# Patient Record
Sex: FEMALE | Race: WHITE | Employment: FULL TIME | URBAN - METROPOLITAN AREA
[De-identification: names, ages, dates, MRNs, and addresses within clinical notes are randomized per-mention and may not be internally consistent; named-entity substitution may affect disease eponyms.]

---

## 2017-01-05 PROBLEM — Z87.442 HISTORY OF RENAL STONE: Status: ACTIVE | Noted: 2017-01-05

## 2017-02-13 ENCOUNTER — APPOINTMENT (OUTPATIENT)
Dept: PHYSICAL THERAPY | Age: 72
End: 2017-02-13

## 2017-05-31 ENCOUNTER — HOSPITAL ENCOUNTER (OUTPATIENT)
Dept: PHYSICAL THERAPY | Age: 72
Discharge: HOME | End: 2017-05-31
Payer: COMMERCIAL

## 2017-05-31 PROCEDURE — G8978 MOBILITY CURRENT STATUS: HCPCS

## 2017-05-31 PROCEDURE — 97530 THERAPEUTIC ACTIVITIES: CPT

## 2017-05-31 PROCEDURE — 97110 THERAPEUTIC EXERCISES: CPT

## 2017-05-31 PROCEDURE — 97162 PT EVAL MOD COMPLEX 30 MIN: CPT

## 2017-05-31 PROCEDURE — G8979 MOBILITY GOAL STATUS: HCPCS

## 2017-05-31 NOTE — PROGRESS NOTES
PHYSICAL THERAPY - DAILY TREATMENT NOTE    Patient Name: Campos Perez        Date: 2017  : 1945   YES Patient  Verified  Visit #:     Insurance: Payor: Angelo Yoder / Plan: [employer] HMO / Product Type: HMO /      In time: 10:55 Out time: 11:55   Total Treatment Time: 60 min     Medicare Time Tracking (below)   Total Timed Codes (min):  23 1:1 Treatment Time:  60     TREATMENT AREA =  S/P R IVANIA    SUBJECTIVE    Pain Level (on 0 to 10 scale):  0  / 10   Medication Changes/New allergies or changes in medical history, any new surgeries or procedures? NO    If yes, update Summary List   Subjective Functional Status/Changes:  []  No changes reported     Pt is S/P R IVANIA anterior approach on 2017. Pt was in hospital x 26 hours and had HHPT x 4 weeks. Pt denies falls since surgery and does not report difficulty getting into/out of home.         OBJECTIVE    Physical Therapy Evaluation- Hip    Gait:  [] Normal    [x] Abnormal    [x] Antalgic    [] NWB    Device: SPC    Describe: Ant trunk lean, dec hip extension, inc R pelvic rotation, dec H/T pattern, antalgic gait         ROM/Strength        AROM                     PROM        Strength (1-5)  Hip Left Right Left Right Left Right   Flexion 116 94 125 100 4 3+   Extension     NT NT   Abduction     4- 3+   ER 33 26 38 27 4- 3   IR 38 30 42 40 4- 3+   Knee Left Right Left Right Left Right   Extension     4+ 4   Flexion     4+ 4     Fair bridge     Flexibility: [] Unable to assess at this time  Hamstrings:    (L) Tightness= [] WNL   [x] Min   [] Mod   [] Severe    (R) Tightness= [] WNL   [] Min   [x] Mod   [] Severe  Quadriceps:    (L) Tightness= [] WNL   [] Min   [] Mod   [] Severe    (R) Tightness= [] WNL   [] Min   [x] Mod   [] Severe  Gastroc:    (L) Tightness= [] WNL   [] Min   [x] Mod   [] Severe    (R) Tightness= [] WNL   [] Min   [x] Mod   [] Severe                                  Palpation  [] Min  [x] Mod [x] Severe    Location: anterior incision, R GT, R gluteals, R ITB and quad/ITB junction      15 (15) min Therapeutic Exercise:  [x]  See flow sheet   Rationale:      increase ROM, increase strength, improve coordination, improve balance and increase proprioception to improve the patients ability to perform ADL's and functional mobility with increased ease and efficiency of movement      8 (8) min Therapeutic Activity: FOTO   Rationale: To assess current functional limitations and review POC    throughout min Patient Education:  YES  Reviewed HEP   []  Progressed/Changed HEP based on:   Provided HEP handout     Other Objective/Functional Measures:    See above     Post Treatment Pain Level (on 0 to 10) scale:   0  / 10     ASSESSMENT    Assessment/Changes in Function:     Patient History: High (r knee pain, Hx bilateral hip bursitis, Sarcoiditis, Age)  Examination (low 1-2, mod 3+, high 4+): Moderate per objective above  Clinical Presentation (low stable or uncomplicated, mod evolving or changing, high unstable or unpredictable): Moderate  Clinical Decision Making (low , mod 26-74, high 1-25): FOTO Moderate     [x]  See Progress Note/Recertification   Patient will continue to benefit from skilled PT services to modify and progress therapeutic interventions, address functional mobility deficits, address ROM deficits, address strength deficits, analyze and address soft tissue restrictions, analyze and cue movement patterns, analyze and modify body mechanics/ergonomics, assess and modify postural abnormalities, address imbalance/dizziness and instruct in home and community integration to attain remaining goals. to attain remaining goals.    Progress toward goals / Updated goals:    See plan of care     PLAN    [x]  Upgrade activities as tolerated YES Continue plan of care   []  Discharge due to :    []  Other:      Therapist: Ivan Barthel, DPT    Date: 5/31/2017 Time: 10:56 AM

## 2017-05-31 NOTE — PROGRESS NOTES
[address]  [facility] PHYSICAL THERAPY  [address], [address] - Phone: [phone]  Fax: [phone] / [address] [facility]  Patient Name: Danny Vargas : 1945   Medical   Diagnosis: Bilateral hip pain [M25.551, M25.552]  Acute postoperative pain of hip [M25.559, G89.18] Treatment Diagnosis: S/P R IVANIA   Onset Date: 2017 AGE: 81 y.o. Referral Source: Nida Bejarano MD [facility]): 2017   Prior Hospitalization: See medical history Provider #: [id number]   Prior Level of Function: Mod (I)   Comorbidities: Sarcoiditis, Hx kidney stones, incontinence, LBP   Medications: Verified on Patient Summary List   The Plan of Care and following information is based on the information from the initial evaluation.   =======================================================================================  Assessment / key information:  Patient is a 81 y.o. female who presents with chief complaint of R hip pain and decreased mobility following R IVANIA on 2017. Pt reports hospital stay x 2 days followed by 4 weeks of HHPT. Pt presents to PT evaluation ambulating with antalgic pattern, SPC and apparent gait deviations and dec dynamic balance. Pt demo dec R hip flexion/extension AROM/PROM, inc TTP cristina-incisional and ITB/VL interface, dec proximal LE strength, dec LE flexibility, dec bed mobility, dec functional transfers and overall impaired mobility. Patient's FOTO functional score is 43% indicating 57% functional disability. Patient would benefit from skilled PT services to address these issues and improve the above stated impairments.   Thank you for this referral     Objective Data:    ROM/Strength            AROM                             PROM                         Strength (1-5)  Hip Left Right Left Right Left Right   Flexion 116 94 125 100 4 3+   Extension         NT NT   Abduction         4- 3+   ER 33 26 38 27 4- 3   IR 38 30 42 40 4- 3+   Knee Left Right Left Right Left Right   Extension         4+ 4   Flexion         4+ 4     ======================================================================================  Eval Complexity: History: HIGH Complexity :3+ comorbidities / personal factors will impact the outcome/ POC Exam:MEDIUM Complexity : 3 Standardized tests and measures addressing body structure, function, activity limitation and / or participation in recreation  Presentation: MEDIUM Complexity : Evolving with changing characteristics  Clinical Decision Making:MEDIUM Complexity : FOTO score of 26-74Overall Complexity:MEDIUM  Problem List: pain affecting function, decrease ROM, decrease strength, edema affecting function, impaired gait/ balance, decrease ADL/ functional abilitiies, decrease activity tolerance, decrease flexibility/ joint mobility and decrease transfer abilities   Treatment Plan may include any combination of the following: Therapeutic exercise, Therapeutic activities, Neuromuscular re-education, Physical agent/modality, Gait/balance training, Manual therapy, Aquatic therapy, Patient education, Self Care training, Functional mobility training, Home safety training and Stair training  Patient / Family readiness to learn indicated by: asking questions, trying to perform skills and interest  Persons(s) to be included in education: patient (P) and family support person (FSP);list   Barriers to Learning/Limitations: None  Measures taken:    Patient Goal (s): Bend/lift, walk without cane   Patient self reported health status: good  Rehabilitation Potential: good   Short Term Goals: To be accomplished in  2-3  Weeks:  1. Patient will be compliant with HEP in order to facilitate progression of therapeutic exercise and improve mobility  2. Patient will demonstrate independent bed mobility moving supine <> sit  3.  Patient will improve R hip flexion AROM to 100 degrees in order to improve safety with stair negotiation  4. Patient will negotiate 5 steps mod (I) w/ LRAD and use of unilateral handrail to improve home navigation.  Long Term Goals: To be accomplished in  4-6  Weeks:  1. Patient will be independent with HEP in order to demonstrate ability to perform therapeutic exercises and continue progressing functional mobility upon D/C  2. Patient will perform 5 x sit to stand independently and equal LE WB in order to improve independence with functional mobility  3. Patient will negotiate 10 steps with reciprocal pattern, no AD and use of unilateral hand rail in order to improve safety with home/community navigation  4. Patient will demonstrate R hip flexion AROM of 105 degrees in order to improve ease functional mobility  5. Patient will improve FOTO score to 50/100 to demonstrate a meaningful improvement in functional mobility and increased quality of life      Frequency / Duration:   Patient to be seen  2-3  times per week for 4-6  weeks:  Patient / Caregiver education and instruction: self care, activity modification and exercises  G-Codes (GP): Mobility [id number] Current  CK= 40-59%   Goal  CK= 40-59%. The severity rating is based on the FOTO Score  Therapist Signature: Keary Favre, DPT Date: 7/78/6961   Certification Period: 5/31/17 - 8/30/17 Time: 12:11 PM   ===========================================================================================  I certify that the above Physical Therapy Services are being furnished while the patient is under my care. I agree with the treatment plan and certify that this therapy is necessary. Physician Signature:        Date:       Time:     Please sign and return to [employer] or you may fax the signed copy to [phone]. Thank you.

## 2017-06-06 ENCOUNTER — HOSPITAL ENCOUNTER (OUTPATIENT)
Dept: PHYSICAL THERAPY | Age: 72
Discharge: HOME | End: 2017-06-06
Payer: COMMERCIAL

## 2017-06-06 PROCEDURE — 97110 THERAPEUTIC EXERCISES: CPT

## 2017-06-06 NOTE — PROGRESS NOTES
PHYSICAL THERAPY - DAILY TREATMENT NOTE      Patient Name: Anya Gutiérrez        Date: 2017  : 1945   YES Patient  Verified  Visit #:   2     Insurance: Payor: Catracho Davey / Plan: [employer] HMO / Product Type: HMO /      In time: 12:58 Out time: 1:52   Total Treatment Time: 54     Medicare Time Tracking (below)   Total Timed Codes (min):  44 1:1 Treatment Time:  44     TREATMENT AREA =  Bilateral hip pain [M25.551, M25.552]  Acute postoperative pain of hip [M25.559, G89.18]    SUBJECTIVE    Pain Level (on 0 to 10 scale):  0  / 10   Medication Changes/New allergies or changes in medical history, any new surgeries or procedures? NO    If yes, update Summary List   Subjective Functional Status/Changes:  []  No changes reported     \"Doing them on the bed, I cannot do. The only one that I find difficulty is that bridge. \"  \"The scar is not as bad, its a little numb but I don't get that electric shock. \"        OBJECTIVE    Modalities Rationale:   min [] Estim, type/location:                                      []  att     []  unatt     []  w/US     []  w/ice    []  w/heat    min []  Mechanical Traction: type/lbs                   []  pro   []  sup   []  int   []  cont    []  before manual    []  after manual    min []  Ultrasound, settings/location:      min []  Iontophoresis w/ dexamethasone, location:                                               []  take home patch       []  in clinic   10 min [x]  Ice     []  Heat    location/position: Supine to R hip w/ BLEs on wedge    min []  Vasopneumatic Device, press/temp:     min []  Other:    [x] Skin assessment post-treatment (if applicable):    [x]  intact    []  redness- no adverse reaction     []redness  adverse reaction:      39 (39) min Therapeutic Exercise:  [x]  See flow sheet   Rationale:      increase ROM, increase strength, improve coordination, improve balance and increase proprioception to improve the patients ability to perform ADL's and functional mobility with increased ease and efficiency of movement      5 (5) min Gait Training: HT gait pattern, pelvic rotation, hip extension w/ and w/o SPC   Rationale:    improve coordination, improve balance and increase proprioception to improve the patients ability to ambulate with improved gait mechanics to dec LE compensation patterns. 1 min Patient Education:  YES  Reviewed HEP   []  Progressed/Changed HEP based on:   Standing hip abduction/extension and heel raises     Other Objective/Functional Measures:    Pt ambulates into clinic SPC L hand. Demo dec R hip extension, medial heel whip,   Initiated session with Nu-step L1  Added standing standing hip abduction/extension 2x5 d/t dec endurance and muscle fatigue. Fair bridge although improved since IE  Attempted clamshell however pt demo inc lateral R hip pain with L sidelying position. Pt required mod(A) to move supine to L sidelying for RLE negotiation. Pain produced when RLE moves into adducted position. Post Treatment Pain Level (on 0 to 10) scale:   0-1  / 10     ASSESSMENT    Assessment/Changes in Function:     Pt with improved gait mechanics with VC and demo at beginning of session today. Tolerated WB therex well today without inc R hip pain. []  See Progress Note/Recertification   Patient will continue to benefit from skilled PT services to modify and progress therapeutic interventions, address functional mobility deficits, address ROM deficits, address strength deficits, analyze and address soft tissue restrictions, analyze and cue movement patterns, analyze and modify body mechanics/ergonomics, assess and modify postural abnormalities, address imbalance/dizziness and instruct in home and community integration to attain remaining goals. Progress toward goals / Updated goals: · Short Term Goals: To be accomplished in 2-3 Weeks:  1.  Patient will be compliant with HEP in order to facilitate progression of therapeutic exercise and improve mobility. Demo compliance  2. Patient will demonstrate independent bed mobility moving supine <> sit. Mod (A) for RLE negotiation moving to L S/L   3. Patient will improve R hip flexion AROM to 100 degrees in order to improve safety with stair negotiation. 4. Patient will negotiate 5 steps mod (I) w/ LRAD and use of unilateral handrail to improve home navigation.  NT         PLAN    [x]  Upgrade activities as tolerated YES Continue plan of care   []  Discharge due to :    []  Other:      Therapist: Fabiola Luna DPT    Date: 6/6/2017 Time: 10:46 AM     Future Appointments  Date Time Provider Yocasta Loaiza   6/6/2017 1:00 PM Cory OMSAN [facility]   6/8/2017 9:00 AM Cassandra José Miguel, [facility]   6/12/2017 9:30 AM Cassandra Valdez, [facility]   6/20/2017 8:30 AM Cassandra Valdez [facility]   6/22/2017 9:00 AM Cassandra Valdez [facility]   6/27/2017 10:00 AM Rubén [facility]   6/30/2017 8:30 AM [facility]

## 2017-06-08 ENCOUNTER — HOSPITAL ENCOUNTER (OUTPATIENT)
Dept: PHYSICAL THERAPY | Age: 72
Discharge: HOME | End: 2017-06-08
Payer: COMMERCIAL

## 2017-06-08 PROCEDURE — 97110 THERAPEUTIC EXERCISES: CPT

## 2017-06-08 PROCEDURE — 97530 THERAPEUTIC ACTIVITIES: CPT

## 2017-06-08 NOTE — PROGRESS NOTES
PHYSICAL THERAPY - DAILY TREATMENT NOTE    Patient Name: Carlos Precise        Date: 2017  : 1945   YES Patient  Verified  Visit #:   3     Insurance: Payor: José Miguel Kaba / Plan: [employer] HMO / Product Type: HMO /      In time: 9:00 Out time: 9:50   Total Treatment Time: 50     Medicare Time Tracking (below)   Total Timed Codes (min):  50 1:1 Treatment Time:  40     TREATMENT AREA =  Bilateral hip pain [M25.551, M25.552]  Acute postoperative pain of hip [M25.559, G89.18]    SUBJECTIVE    Pain Level (on 0 to 10 scale):  0  / 10   Medication Changes/New allergies or changes in medical history, any new surgeries or procedures? NO     If yes, update Summary List   Subjective Functional Status/Changes:  []  No changes reported     \"My hip is pretty much the same. I am more confident walking around, there is still stiffness and pain. I can't go back to work, they won't hold the position for me. I felt ok after the other day. I am so sore and stiff when I lay down, I can't sleep without a pillow under my leg.  I am feeling more confident with walking\"          OBJECTIVE    42 (32) min Therapeutic Exercise:  [x]  See flow sheet   Rationale:      increase ROM, increase strength, improve coordination, improve balance and increase proprioception to improve the patients ability to perform ADL's and amb with decreased pain and improved ease     8 min Therapeutic Activity: Step-ups, sit to stand   Rationale:   Improve safety and stability with home and community mobility     min Patient Education:  YES  Reviewed HEP   []  Progressed/Changed HEP based on:   Patient reports compliance     Other Objective/Functional Measures:    Able to perform std hip abd/ext 10 reps without break  Unable to perform step-up on stair, able to perform 10 reps on 4\" box  Performed sit to stands, cues to avoid UE assist. Able to perform 2 reps no UE, 5 reps 1 UE with cueing  Cues to maintain TA control to decrease strain on lumbar spine     Post Treatment Pain Level (on 0 to 10) scale:   0  / 10     ASSESSMENT    Assessment/Changes in Function:     Patient with improving strength, activity tolerance, and exercise tolerance this visit     []  See Progress Note/Recertification   Patient will continue to benefit from skilled PT services to modify and progress therapeutic interventions, address functional mobility deficits, address ROM deficits, address strength deficits, analyze and address soft tissue restrictions, analyze and cue movement patterns, analyze and modify body mechanics/ergonomics and assess and modify postural abnormalities to attain remaining goals. Progress toward goals / Updated goals: · Short Term Goals: To be accomplished in 2-3 Weeks:  1. Patient will be compliant with HEP in order to facilitate progression of therapeutic exercise and improve mobility. Demo compliance  2. Patient will demonstrate independent bed mobility moving supine <> sit. Mod (A) for RLE negotiation moving to L S/L   3. Patient will improve R hip flexion AROM to 100 degrees in order to improve safety with stair negotiation. 4. Patient will negotiate 5 steps mod (I) w/ LRAD and use of unilateral handrail to improve home navigation.  NT     PLAN    [x]  Upgrade activities as tolerated YES Continue plan of care   []  Discharge due to :    []  Other:      Therapist: Mingo Prado PT    Date: 6/8/2017 Time: 9:05 AM     Future Appointments  Date Time Provider Yocasta Loaiza   6/12/2017 9:30 AM Mingo Prado [facility]   6/14/2017 3:30 PM Mingo Prado PT [facility]   6/20/2017 8:30 AM Mingo Prado PT [facility]   6/22/2017 9:00 AM Mingo Prado PT [facility]   6/27/2017 10:00 AM [facility]   6/30/2017 8:30 AM [facility]

## 2017-06-12 ENCOUNTER — HOSPITAL ENCOUNTER (OUTPATIENT)
Dept: PHYSICAL THERAPY | Age: 72
Discharge: HOME | End: 2017-06-12
Payer: COMMERCIAL

## 2017-06-12 PROCEDURE — 97110 THERAPEUTIC EXERCISES: CPT

## 2017-06-12 NOTE — PROGRESS NOTES
PHYSICAL THERAPY - DAILY TREATMENT NOTE    Patient Name: Xochitl Mora        Date: 2017  : 1945   YES Patient  Verified  Visit #:     Insurance: Payor: Lina Jadyn / Plan: [employer]MO / Product Type: HMO /      In time: 9:22 Out time: 10:10   Total Treatment Time: 48     Medicare Time Tracking (below)   Total Timed Codes (min):  48 1:1 Treatment Time:  30     TREATMENT AREA =  Bilateral hip pain [M25.551, M25.552]  Acute postoperative pain of hip [M25.559, G89.18]    SUBJECTIVE    Pain Level (on 0 to 10 scale):  0  / 10   Medication Changes/New allergies or changes in medical history, any new surgeries or procedures? NO     If yes, update Summary List   Subjective Functional Status/Changes:  []  No changes reported     \"I noticed last night my spasms aren't as severe. \"          OBJECTIVE    48 (30) min Therapeutic Exercise:  [x]  See flow sheet   Rationale:      increase ROM, increase strength, improve coordination, improve balance and increase proprioception to improve the patients ability to perform ADL's and amb with decreased pain and improved ease      min Patient Education:  YES  Reviewed HEP   []  Progressed/Changed HEP based on:   Patient reports compliance     Other Objective/Functional Measures:    Discussed with patient gradual return to driving, advised to practice in empty parking lot first and progress as able safely  Patient amb without AD today with good mechanics  Able to perform 10 sit to stand on elevated mat without UE assist     Post Treatment Pain Level (on 0 to 10) scale:   0  / 10     ASSESSMENT    Assessment/Changes in Function:     Patient required less cueing and able to perform sit to stand without use of UE's     []  See Progress Note/Recertification   Patient will continue to benefit from skilled PT services to modify and progress therapeutic interventions, address functional mobility deficits, address ROM deficits, address strength deficits, analyze and address soft tissue restrictions, analyze and cue movement patterns, analyze and modify body mechanics/ergonomics and assess and modify postural abnormalities to attain remaining goals. Progress toward goals / Updated goals: · Short Term Goals: To be accomplished in 2-3 Weeks:  1. Patient will be compliant with HEP in order to facilitate progression of therapeutic exercise and improve mobility. Demo compliance  2. Patient will demonstrate independent bed mobility moving supine <> sit. Mod (A) for RLE negotiation moving to L S/L   3. Patient will improve R hip flexion AROM to 100 degrees in order to improve safety with stair negotiation. 4. Patient will negotiate 5 steps mod (I) w/ LRAD and use of unilateral handrail to improve home navigation.  NT     PLAN    [x]  Upgrade activities as tolerated YES Continue plan of care   []  Discharge due to :    []  Other:      Therapist: Chester Fuentes PT    Date: 6/12/2017 Time: 9:27 AM     Future Appointments  Date Time Provider Yocasta Loaiza   6/12/2017 9:30 AM Chester Fuentes PT [facility]   6/14/2017 3:30 PM Chester Fuentes PT [facility]   6/20/2017 8:30 AM Chester Fuentes PT [facility]   6/22/2017 9:00 AM Chester Fuentes PT [facility]   6/27/2017 10:00 AM [facility]   6/30/2017 8:30 AM [facility]

## 2017-06-14 ENCOUNTER — HOSPITAL ENCOUNTER (OUTPATIENT)
Dept: PHYSICAL THERAPY | Age: 72
Discharge: HOME | End: 2017-06-14
Payer: COMMERCIAL

## 2017-06-14 PROCEDURE — 97110 THERAPEUTIC EXERCISES: CPT

## 2017-06-14 NOTE — PROGRESS NOTES
PHYSICAL THERAPY - DAILY TREATMENT NOTE    Patient Name: Concepcion Shafer        Date: 2017  : 1945   YES Patient  Verified  Visit #:     Insurance: Payor: Shasta Sathya / Plan: [employer] HMO / Product Type: HMO /      In time: 3:15 Out time: 4:05   Total Treatment Time: 50     Medicare Time Tracking (below)   Total Timed Codes (min):  50 1:1 Treatment Time:  38     TREATMENT AREA =  Bilateral hip pain [M25.551, M25.552]  Acute postoperative pain of hip [M25.559, G89.18]    SUBJECTIVE    Pain Level (on 0 to 10 scale):  0  / 10   Medication Changes/New allergies or changes in medical history, any new surgeries or procedures? NO     If yes, update Summary List   Subjective Functional Status/Changes:  []  No changes reported     \"I am having a bit of an upset stomach today, but I think I am ok to exercise. I am not using my cane at all anymore! .\"          OBJECTIVE    50 (38) min Therapeutic Exercise:  [x]  See flow sheet   Rationale:      increase ROM, increase strength, improve coordination, improve balance and increase proprioception to improve the patients ability to perform ADL\"S and amb with decreased pain and improved ease      min Patient Education:  YES  Reviewed HEP   []  Progressed/Changed HEP based on:   Patient reports compliance     Other Objective/Functional Measures:    patient with min cueing for form with therex  Able to perform sit to stand with mat slightly lowered with no UE assist  Progressed reps/resistance with good tolerance     Post Treatment Pain Level (on 0 to 10) scale:   0  / 10     ASSESSMENT    Assessment/Changes in Function:     Patient is progressing well with therapy at this time     []  See Progress Note/Recertification   Patient will continue to benefit from skilled PT services to modify and progress therapeutic interventions, address functional mobility deficits, address ROM deficits, address strength deficits, analyze and address soft tissue restrictions, analyze and cue movement patterns, analyze and modify body mechanics/ergonomics and assess and modify postural abnormalities to attain remaining goals. Progress toward goals / Updated goals: · Short Term Goals: To be accomplished in 2-3 Weeks:  1. Patient will be compliant with HEP in order to facilitate progression of therapeutic exercise and improve mobility. Demo compliance  2. Patient will demonstrate independent bed mobility moving supine <> sit. Mod (A) for RLE negotiation moving to L S/L   3. Patient will improve R hip flexion AROM to 100 degrees in order to improve safety with stair negotiation. 4. Patient will negotiate 5 steps mod (I) w/ LRAD and use of unilateral handrail to improve home navigation.  NT     PLAN    [x]  Upgrade activities as tolerated YES Continue plan of care   []  Discharge due to :    []  Other:      Therapist: Priscilla Pinedo PT    Date: 6/14/2017 Time: 3:07 PM     Future Appointments  Date Time Provider Yocasta Loaiza   6/14/2017 3:30 PM Priscilla Pinedo PT [facility]   6/20/2017 8:30 AM Priscilla Pinedo PT [facility]   6/22/2017 9:00 AM Priscilla Pinedo PT [facility]   6/27/2017 10:00 AM [facility]   6/30/2017 8:30 AM [facility]

## 2017-06-20 ENCOUNTER — HOSPITAL ENCOUNTER (OUTPATIENT)
Dept: PHYSICAL THERAPY | Age: 72
Discharge: HOME | End: 2017-06-20
Payer: COMMERCIAL

## 2017-06-20 PROCEDURE — 97110 THERAPEUTIC EXERCISES: CPT

## 2017-06-20 NOTE — PROGRESS NOTES
PHYSICAL THERAPY - DAILY TREATMENT NOTE    Patient Name: Anu Perla        Date: 2017  : 1945   YES Patient  Verified  Visit #:     Insurance: Payor: Milagros Cabreraler / Plan: [employer] HMO / Product Type: HMO /      In time: 9:28 Out time: 10:26   Total Treatment Time: 58     Medicare Time Tracking (below)   Total Timed Codes (min):  48 1:1 Treatment Time:  40     TREATMENT AREA =  Bilateral hip pain [M25.551, M25.552]  Acute postoperative pain of hip [M25.559, G89.18]    SUBJECTIVE    Pain Level (on 0 to 10 scale):  8-9  / 10   Medication Changes/New allergies or changes in medical history, any new surgeries or procedures? NO     If yes, update Summary List   Subjective Functional Status/Changes:  []  No changes reported     \"Yesterday I have this sharp pain in my hip and back, I don't know why. It is agony when I put weight on my R leg. It is R here (points to R PSIS). I iced yesterday and had to take a tramadol. \"     \"A week before my surgery I lifted something heavy at work and this same thing happened. I reported it at work also. Would they say this is a new occurrence, if so thank goodness. \"    \"I did notice I am crossing my legs more, I did also lay on my side in bed and I started hurting so I rolled back. \"    \"I drove yesterday! It didn't hurt when I drove but I could barely stand when I got out of the car. \"    \"Do you think I should go to the doctor today? \"         OBJECTIVE    Modalities Rationale:  palliative      min [] Estim, type/location:                                      []  att     []  unatt     []  w/US     []  w/ice    []  w/heat    min []  Mechanical Traction: type/lbs                   []  pro   []  sup   []  int   []  cont    []  before manual    []  after manual    min []  Ultrasound, settings/location:      min []  Iontophoresis w/ dexamethasone, location:                                               []  take home patch       []  in clinic   10 min [x]  Ice     []  Heat    location/position: Supine with LE wedge to R hip/SIJ region    min []  Vasopneumatic Device, press/temp:     min []  Other:    [x] Skin assessment post-treatment (if applicable):   [x]  intact    []  redness- no adverse reaction     []redness  adverse reaction:      48 (40) min Therapeutic Exercise:  [x]  See flow sheet   Rationale:      increase ROM, increase strength, improve coordination, improve balance and increase proprioception to improve the patients ability to perform ADL's and amb with decreased pain and improved ease      min Patient Education:  YES  Reviewed HEP   []  Progressed/Changed HEP based on:   Patient reports compliance     Other Objective/Functional Measures:    patient with antalgic very slow gait upon arrival, improved with increased mobility  Held some ex's due to SIJ pain; educated patient on SIJ dysfunction and relationship of muscle imbalances  Patient requested what she should tell MD to put on script for back, informed patient that she will need to be evaluated by doctor for back/possible SIJ pain before they will write script for therapy, then will need to set up additional evaluation  Reports relief in symptoms with ice  Performed sit to stand at 20\"     Post Treatment Pain Level (on 0 to 10) scale:   7  / 10     ASSESSMENT    Assessment/Changes in Function:     Progress limited this session due to SIJ dysfunction     []  See Progress Note/Recertification   Patient will continue to benefit from skilled PT services to modify and progress therapeutic interventions, address functional mobility deficits, address ROM deficits, address strength deficits, analyze and address soft tissue restrictions, analyze and cue movement patterns, analyze and modify body mechanics/ergonomics and assess and modify postural abnormalities to attain remaining goals. Progress toward goals / Updated goals: · Short Term Goals: To be accomplished in 2-3 Weeks:  1. Patient will be compliant with HEP in order to facilitate progression of therapeutic exercise and improve mobility. Demo compliance  2. Patient will demonstrate independent bed mobility moving supine <> sit. Mod (A) for RLE negotiation moving to L S/L   3. Patient will improve R hip flexion AROM to 100 degrees in order to improve safety with stair negotiation. 4. Patient will negotiate 5 steps mod (I) w/ LRAD and use of unilateral handrail to improve home navigation.  NT     PLAN    [x]  Upgrade activities as tolerated YES Continue plan of care   []  Discharge due to :    []  Other:      Therapist: Mino Powell PT    Date: 6/20/2017 Time: 9:32 AM     Future Appointments  Date Time Provider Yocasta Loaiza   6/22/2017 9:00 AM [address] [place] [address]   6/27/2017 10:00 AM [facility]   6/30/2017 8:30 AM [facility]   7/3/2017 4:30 PM Mino Powell PT [facility]   7/5/2017 11:30 AM Mino Powell PT [facility]   7/11/2017 10:30 AM Mino Powell PT [facility]   7/13/2017 9:30 AM Mino Powell PT [facility]   7/18/2017 11:30 AM Mino Powell PT [facility]   7/20/2017 9:30 AM Mino Powell PT [facility]   7/25/2017 11:00 AM Mino Powell PT [facility]   7/27/2017 3:00 PM Mino Powell PT [facility]

## 2017-06-22 ENCOUNTER — HOSPITAL ENCOUNTER (OUTPATIENT)
Dept: PHYSICAL THERAPY | Age: 72
Discharge: HOME | End: 2017-06-22
Payer: COMMERCIAL

## 2017-06-22 PROCEDURE — 97110 THERAPEUTIC EXERCISES: CPT

## 2017-06-22 NOTE — PROGRESS NOTES
PHYSICAL THERAPY - DAILY TREATMENT NOTE    Patient Name: Miley Schwarz        Date: 2017  : 1945   YES Patient  Verified  Visit #:     Insurance: Payor: Stephanie Peres / Plan: [employer] HMO / Product Type: HMO /      In time: 9:00 Out time: 10:10   Total Treatment Time: 70     Medicare Time Tracking (below)   Total Timed Codes (min):  60 1:1 Treatment Time:  53     TREATMENT AREA =  Bilateral hip pain [M25.551, M25.552]  Acute postoperative pain of hip [M25.559, G89.18]    SUBJECTIVE    Pain Level (on 0 to 10 scale):  8  / 10   Medication Changes/New allergies or changes in medical history, any new surgeries or procedures? YES     If yes, update Summary List   Subjective Functional Status/Changes:  []  No changes reported     \"My back pain is down about 1 point from the other day. The doctor gave me a muscle relaxer, chlorzoxazone. Should I be taking that? He said to just take it at night because it would make me tired. Am I ever going to get better? \"          OBJECTIVE    Modalities Rationale:  palliative      min [] Estim, type/location:                                      []  att     []  unatt     []  w/US     []  w/ice    []  w/heat    min []  Mechanical Traction: type/lbs                   []  pro   []  sup   []  int   []  cont    []  before manual    []  after manual    min []  Ultrasound, settings/location:      min []  Iontophoresis w/ dexamethasone, location:                                               []  take home patch       []  in clinic   10 min [x]  Ice     []  Heat    location/position: R SIJ/hip    min []  Vasopneumatic Device, press/temp:     min []  Other:    [x] Skin assessment post-treatment (if applicable):   [x]  intact    []  redness- no adverse reaction     []redness  adverse reaction:      60 (53) min Therapeutic Exercise:  [x]  See flow sheet   Rationale:      increase ROM, increase strength, improve coordination, improve balance and increase proprioception to improve the patients ability to perform ADL's and amb with decreased pain and improved ease      min Patient Education:  YES  Reviewed HEP   []  Progressed/Changed HEP based on:   Patient reports compliance     Other Objective/Functional Measures:    Patient continues to perseverate on SIJ pain, scheduled for eval 7/5  Able to decreased height of mat to 19.5\" for sit to stand  Unable to tolerate step-ups due to SIJ pain  Advised patient to trial muscle relaxers tonight to assess tolerance     Post Treatment Pain Level (on 0 to 10) scale:   6-7  / 10     ASSESSMENT    Assessment/Changes in Function:     Patient continues to be limited by SIJ dysfunction and fear of discomfort     []  See Progress Note/Recertification   Patient will continue to benefit from skilled PT services to modify and progress therapeutic interventions, address functional mobility deficits, address ROM deficits, address strength deficits, analyze and address soft tissue restrictions, analyze and cue movement patterns, analyze and modify body mechanics/ergonomics and assess and modify postural abnormalities to attain remaining goals. Progress toward goals / Updated goals: · Short Term Goals: To be accomplished in 2-3 Weeks:  1. Patient will be compliant with HEP in order to facilitate progression of therapeutic exercise and improve mobility. Demo compliance  2. Patient will demonstrate independent bed mobility moving supine <> sit. Mod (A) for RLE negotiation moving to L S/L   3. Patient will improve R hip flexion AROM to 100 degrees in order to improve safety with stair negotiation. 4. Patient will negotiate 5 steps mod (I) w/ LRAD and use of unilateral handrail to improve home navigation.  NT     PLAN    [x]  Upgrade activities as tolerated YES Continue plan of care   []  Discharge due to :    []  Other:      Therapist: Shaye Epstein PT    Date: 6/22/2017 Time: 9:10 AM     Future Appointments  Date Time Provider Yocasta Loaiza   6/27/2017 10:00 AM Odilon OSMAN [facility]   6/30/2017 8:30 AM Odilon OSMAN [facility]   7/3/2017 4:30 PM Chester Fuentes, [facility]   7/5/2017 11:30 AM Chester Fuentes, [facility]   7/11/2017 10:30 AM Chester Fuentes, [facility]   7/13/2017 9:30 AM Chester Fuentes, [facility]   7/18/2017 11:30 AM Chester Fuentes, PT [facility]   7/20/2017 9:30 AM Chester Fuenets, PT [facility]   7/25/2017 11:00 AM Chesetr Fuentes, [facility]   7/27/2017 3:00 PM Chester Fuentes, [facility]

## 2017-06-27 ENCOUNTER — HOSPITAL ENCOUNTER (OUTPATIENT)
Dept: PHYSICAL THERAPY | Age: 72
Discharge: HOME | End: 2017-06-27
Payer: COMMERCIAL

## 2017-06-27 PROCEDURE — 97110 THERAPEUTIC EXERCISES: CPT

## 2017-06-27 PROCEDURE — 97140 MANUAL THERAPY 1/> REGIONS: CPT

## 2017-06-27 NOTE — PROGRESS NOTES
PHYSICAL THERAPY - DAILY TREATMENT NOTE      Patient Name: Radha Shirley        Date: 2017  : 1945   YES Patient  Verified  Visit #:     Insurance: Payor: Danis Both / Plan: [employer] HMO / Product Type: HMO /      In time: 10:00 Out time: 11:00   Total Treatment Time: 60 min     Medicare Time Tracking (below)   Total Timed Codes (min):  50 1:1 Treatment Time:  40     TREATMENT AREA =  Bilateral hip pain [M25.551, M25.552]  Acute postoperative pain of hip [M25.559, G89.18]    SUBJECTIVE    Pain Level (on 0 to 10 scale):    / 10   Medication Changes/New allergies or changes in medical history, any new surgeries or procedures? NO    If yes, update Summary List   Subjective Functional Status/Changes:  []  No changes reported     \"My pain was an 11/10 on  in my back. \"        OBJECTIVE    Modalities Rationale:   min [] Estim, type/location:                                      []  att     []  unatt     []  w/US     []  w/ice    []  w/heat    min []  Mechanical Traction: type/lbs                   []  pro   []  sup   []  int   []  cont    []  before manual    []  after manual    min []  Ultrasound, settings/location:      min []  Iontophoresis w/ dexamethasone, location:                                               []  take home patch       []  in clinic   10 min [x]  Ice     []  Heat    location/position: H/L to R SIJ    min []  Vasopneumatic Device, press/temp:     min []  Other:    [x] Skin assessment post-treatment (if applicable):    [x]  intact    []  redness- no adverse reaction     []redness  adverse reaction:      30 (30) min Therapeutic Exercise:  [x]  See flow sheet   Rationale:      increase ROM, increase strength, improve coordination, improve balance and increase proprioception to improve the patients ability to perform ADL's and functional mobility with increased ease and efficiency of movement      10 (10) min Manual Therapy: SIJ mobilizations, Shotgun, R ant rotation correction MET   Rationale:      decrease pain, increase ROM, increase tissue extensibility and increase postural awareness to improve patient's ability to dec pain with ambulation    1 min Patient Education:  YES  Reviewed HEP   []  Progressed/Changed HEP based on: Other Objective/Functional Measures:    R anteriorly rotated innominate. Slight pain reduction with RLE MMT performed in H/L position. Performed shotgun technique with slight relief  Able to bridge with dec pain following MET  Significant TTP R gluteal musculature and R SIJ     Post Treatment Pain Level (on 0 to 10) scale:   4 / 10     ASSESSMENT    Assessment/Changes in Function:     Pt progress limited at this time secondary to R SIJ pain. []  See Progress Note/Recertification   Patient will continue to benefit from skilled PT services to modify and progress therapeutic interventions, address functional mobility deficits, address ROM deficits, address strength deficits, analyze and address soft tissue restrictions, analyze and cue movement patterns, analyze and modify body mechanics/ergonomics, assess and modify postural abnormalities, address imbalance/dizziness and instruct in home and community integration to attain remaining goals. Progress toward goals / Updated goals: · Short Term Goals: To be accomplished in  2-3  Weeks:  1. Patient will be compliant with HEP in order to facilitate progression of therapeutic exercise and improve mobility. Reports compliance  2. Patient will demonstrate independent bed mobility moving supine <> sit. MET  3. Patient will improve R hip flexion AROM to 100 degrees in order to improve safety with stair negotiation  4. Patient will negotiate 5 steps mod (I) w/ LRAD and use of unilateral handrail to improve home navigation.        PLAN    [x]  Upgrade activities as tolerated YES Continue plan of care   []  Discharge due to :    []  Other:      Therapist: Ruperto Viveros LIA Cummings    Date: 6/27/2017 Time: 8:37 AM     Future Appointments  Date Time Provider Yocasta Degrooti   6/27/2017 10:00 AM Cory OSMAN [facility]   6/30/2017 8:30 AM Nelly OSMAN [facility]   7/3/2017 4:30 PM Storm Elizabeth, PT [facility]   7/5/2017 2:00 PM Storm Elizabeth, PT [facility]   7/7/2017 10:30 AM Storm Elizabeth, PT [facility]   7/11/2017 10:30 AM Storm Elizabeth, PT [facility]   7/13/2017 9:00 AM Storm Elizabeth, PT [facility]   7/18/2017 2:00 PM Storm Elizabeth, PT [facility]   7/20/2017 9:00 AM Storm Elizabeth, PT [facility]   7/25/2017 2:00 PM Storm Elizabeth, PT [facility]   7/27/2017 3:00 PM Storm Elizabeth, PT [facility]

## 2017-06-30 ENCOUNTER — HOSPITAL ENCOUNTER (OUTPATIENT)
Dept: PHYSICAL THERAPY | Age: 72
Discharge: HOME | End: 2017-06-30
Payer: COMMERCIAL

## 2017-06-30 PROCEDURE — 97530 THERAPEUTIC ACTIVITIES: CPT

## 2017-06-30 PROCEDURE — G8978 MOBILITY CURRENT STATUS: HCPCS

## 2017-06-30 PROCEDURE — 97110 THERAPEUTIC EXERCISES: CPT

## 2017-06-30 PROCEDURE — G8979 MOBILITY GOAL STATUS: HCPCS

## 2017-06-30 NOTE — PROGRESS NOTES
PHYSICAL THERAPY - DAILY TREATMENT NOTE      Patient Name: Teresa Hwoe        Date: 2017  : 1945   YES Patient  Verified  Visit #:     Insurance: Payor: Victorino Bean / Plan: [employer] HMO / Product Type: HMO /      In time: 8:15 Out time: 9:15   Total Treatment Time: 60 min     Medicare Time Tracking (below)   Total Timed Codes (min):  60 1:1 Treatment Time:  54     TREATMENT AREA =  Bilateral hip pain [M25.551, M25.552]  Acute postoperative pain of hip [M25.559, G89.18]    SUBJECTIVE    Pain Level (on 0 to 10 scale):  0  / 10   Medication Changes/New allergies or changes in medical history, any new surgeries or procedures? NO    If yes, update Summary List   Subjective Functional Status/Changes:  []  No changes reported     \"It's still bothering me a lot, but not too much today. \" (referring to SIJ)  \"The hip is doing great. \"        OBJECTIVE    35 (29) min Therapeutic Exercise:  [x]  See flow sheet   Rationale:      increase ROM, increase strength, improve coordination, improve balance and increase proprioception to improve the patients ability to perform ADL's and functional mobility with increased ease and efficiency of movement      25 (25) min Therapeutic Activity: FOTO, Re-assessment, Gait training with RW   Rationale: To assess current functional limitations and review POC, improve ease/efficiency and dec pain with ambulation    1 min Patient Education:  YES  Reviewed HEP   []  Progressed/Changed HEP based on:         Other Objective/Functional Measures:    See PN     Post Treatment Pain Level (on 0 to 10) scale:   0  / 10     ASSESSMENT    Assessment/Changes in Function:     See Pn     []  See Progress Note/Recertification   Patient will continue to benefit from skilled PT services to modify and progress therapeutic interventions, address functional mobility deficits, address ROM deficits, address strength deficits, analyze and address soft tissue restrictions, analyze and cue movement patterns, analyze and modify body mechanics/ergonomics, assess and modify postural abnormalities, address imbalance/dizziness and instruct in home and community integration to attain remaining goals.      Progress toward goals / Updated goals:    See PN     PLAN    [x]  Upgrade activities as tolerated YES Continue plan of care   []  Discharge due to :    []  Other:      Therapist: Mariusz Hoffmann DPT    Date: 6/30/2017 Time: 8:28 AM     Future Appointments  Date Time Provider Yocasta Loaiza   6/30/2017 8:30 AM Joe OSMAN [facility]   7/3/2017 4:30 PM Juliette Gitelman, [facility]   7/5/2017 2:00 PM Juliette Gitelman, [facility]   7/7/2017 10:30 AM Juliette Gitelman, [facility]   7/11/2017 10:30 AM Juliette Gitelman, [facility]   7/13/2017 9:00 AM Juliette Gitelman, [facility]   7/18/2017 2:00 PM Juliette Gitelman, [facility]   7/20/2017 9:00 AM Juliette Gitelman, [facility]   7/25/2017 2:00 PM Juliette Gitelman, [facility]   7/27/2017 3:00 PM Juliette Gitelman, [facility]

## 2017-06-30 NOTE — PROGRESS NOTES
[address]  [facility] PHYSICAL THERAPY  [address] [facility] [address] Papp[address], [address] - Phone: [phone]  Fax: [phone]  PROGRESS NOTE  Patient Name: Amarilis Sullivan : 1945   Treatment/Medical Diagnosis: Bilateral hip pain [M25.551, M25.552]  Acute postoperative pain of hip [M25.559, G89.18]   Referral Source: Fabiola Reynolds MD     Date of Initial Visit: 17 Attended Visits: 9 Missed Visits: 0     SUMMARY OF TREATMENT  Patient's POC has consisted of therex, therapeutic activities, manual therapy prn, modalities prn, pt. education, and a comprehensive HEP. Treatment strategies used to address functional mobility deficits, ROM deficits, strength deficits, analyze and address soft tissue restrictions, analyze and cue movement patterns, analyze and modify body mechanics/ergonomics, assess and modify postural abnormalities and instruct in home and community integration. Key Functional Changes/Progress  Patient demonstrates improved hip flexion, independence with bed mobility with increased time, minimal hip pain with transfers and improved overall hip girdle strength since IE. Patient's progress, however, has been significantly limited secondary to onset of R SI joint pain that began insidiously in mid . Pt consistently c/o pain levels between 4-8/10 in R SIJ. Pt is scheduled for PT evaluation on  for this and will initiate interventions to address SIJ pain. ROM/Strength            AROM                             PROM                         Strength (1-5)  Hip Left Right Left Right Left Right   Flexion 116 108 125 112 4 4-   Extension             NT NT   Abduction             4- 3+   ER 33 26 38 27 4 4-   IR 38 30 42 40 4 4   Knee Left Right Left Right Left Right   Extension             5 5   Flexion             5 5       Goal/Measure of Progress Goal Met? 1.  Patient will be compliant with HEP in order to facilitate progression of therapeutic exercise and improve mobility  2. Patient will demonstrate independent bed mobility moving supine <> sit  3. Patient will improve R hip flexion AROM to 100 degrees in order to improve safety with stair negotiation  4. Patient will negotiate 5 steps mod (I) w/ LRAD and use of unilateral handrail to improve home navigation. 1. MET    2. MET    3. MET    4. Not Met     New Goals to be achieved in __4__  weeks:  1. Patient will be independent with HEP in order to demonstrate ability to perform therapeutic exercises and continue progressing functional mobility upon D/C  2. Patient will perform 5 x sit to stand independently and equal LE WB in order to improve independence with functional mobility  3. Patient will negotiate 10 steps with reciprocal pattern w/ LRAD and use of unilateral hand rail in order to improve safety with home/community navigation  4. Patient will demonstrate R hip flexion AROM of 110 degrees in order to improve ease functional mobility  5. Patient will improve FOTO score to 60/100 to demonstrate a meaningful improvement in functional mobility and increased quality of life  Frequency / Duration:   Patient to be seen  2  times per week for 4  weeks:    G-Codes: Mobility  Current  CK= 40-59%   Goal  CK= 40-59%. The severity rating is based on the FOTO Score     RECOMMENDATIONS  Continue and progress functional therex/therapeutic activity as able, utilizing manual therapy and modalities prn. Progress patient towards independent HEP to facilitate self-management of symptoms and progress gains after PT. If you have any questions/comments please contact us directly at [phone]. Thank you for allowing us to assist in the care of your patient. Therapist Signature:  Nu Freeman DPT Date: 5/21/0665   Certification Period: 5/31/17 - 8/30/17     Reporting Period 5/31/17 - 6/30/17   Time: 8:29 AM   NOTE TO PHYSICIAN:  PLEASE COMPLETE THE ORDERS BELOW AND FAX TO   [facility]: [phone]. If you are unable to process this request in 24 hours please contact our office: [phone].    ___ I have read the above report and request that my patient continue as recommended.   ___ I have read the above report and request that my patient continue therapy with the following changes/special instructions:_________________________________________________________   ___ I have read the above report and request that my patient be discharged from therapy.      Physician Signature:        Date:       Time:

## 2017-07-03 ENCOUNTER — HOSPITAL ENCOUNTER (OUTPATIENT)
Dept: PHYSICAL THERAPY | Age: 72
Discharge: HOME | End: 2017-07-03
Payer: COMMERCIAL

## 2017-07-03 PROCEDURE — 97110 THERAPEUTIC EXERCISES: CPT

## 2017-07-03 NOTE — PROGRESS NOTES
PHYSICAL THERAPY - DAILY TREATMENT NOTE    Patient Name: Roscoe Ervin        Date: 7/3/2017  : 1945   YES Patient  Verified  Visit #:   10   of   12-18  Insurance: Payor: Jodee Ureña / Plan: [employer] / Product Type: HMO /      In time: 4:05 Out time: 5:18   Total Treatment Time: 73     Medicare Time Tracking (below)   Total Timed Codes (min):  63 1:1 Treatment Time:  55     TREATMENT AREA =  Bilateral hip pain [M25.551, M25.552]  Acute postoperative pain of hip [M25.559, G89.18]    SUBJECTIVE    Pain Level (on 0 to 10 scale):  5  / 10   Medication Changes/New allergies or changes in medical history, any new surgeries or procedures? NO     If yes, update Summary List   Subjective Functional Status/Changes:  []  No changes reported     \"I went to my PCP this morning because I have a UTI. I need to get a note to continue my Short Term Disability, he said I need to go to a physiatrist at [facility]. The spasms are better, I have been using my walker since Friday when Geraldo Nava told me. \"          OBJECTIVE    Modalities Rationale:  palliative      min [] Estim, type/location:                                      []  att     []  unatt     []  w/US     []  w/ice    []  w/heat    min []  Mechanical Traction: type/lbs                   []  pro   []  sup   []  int   []  cont    []  before manual    []  after manual    min []  Ultrasound, settings/location:      min []  Iontophoresis w/ dexamethasone, location:                                               []  take home patch       []  in clinic   10 min [x]  Ice     []  Heat    location/position: Supine with LE wedge    min []  Vasopneumatic Device, press/temp:     min []  Other:    [x] Skin assessment post-treatment (if applicable):   [x]  intact    []  redness- no adverse reaction     []redness  adverse reaction:      63 (55) min Therapeutic Exercise:  [x]  See flow sheet   Rationale:      increase ROM, increase strength, improve coordination, improve balance and increase proprioception to improve the patients ability to perform ADL's and amb with decreased pain and improved ease      min Patient Education:  YES  Reviewed HEP   []  Progressed/Changed HEP based on:   Patient reports compliance     Other Objective/Functional Measures:    Patient with fair tolerance to therex, limited by SIJ pain  Informed patient we will further discuss plan for SIJ/back after eval on Wed     Post Treatment Pain Level (on 0 to 10) scale:   1  / 10     ASSESSMENT    Assessment/Changes in Function:     Patient with fair tolerance to therex     []  See Progress Note/Recertification   Patient will continue to benefit from skilled PT services to modify and progress therapeutic interventions, address functional mobility deficits, address ROM deficits, address strength deficits, analyze and address soft tissue restrictions, analyze and cue movement patterns, analyze and modify body mechanics/ergonomics and assess and modify postural abnormalities to attain remaining goals. Progress toward goals / Updated goals:       New Goals to be achieved in __4__  weeks:  1. Patient will be independent with HEP in order to demonstrate ability to perform therapeutic exercises and continue progressing functional mobility upon D/C  2. Patient will perform 5 x sit to stand independently and equal LE WB in order to improve independence with functional mobility  3. Patient will negotiate 10 steps with reciprocal pattern w/ LRAD and use of unilateral hand rail in order to improve safety with home/community navigation  4. Patient will demonstrate R hip flexion AROM of 110 degrees in order to improve ease functional mobility  5.  Patient will improve FOTO score to 60/100 to demonstrate a meaningful improvement in functional mobility and increased quality of life     PLAN    [x]  Upgrade activities as tolerated YES Continue plan of care   []  Discharge due to :    []  Other: Therapist: Cat Royal PT    Date: 7/3/2017 Time: 4:08 PM     Future Appointments  Date Time Provider Yocasta Loaiza   7/3/2017 4:30 PM Cat Royal PT [facility]   7/5/2017 2:00 PM Cat Royal PT [facility]   7/7/2017 10:30 AM Cat Royal PT [facility]   7/11/2017 10:30 AM Cat Royal PT [facility]   7/13/2017 9:00 AM Cat Royal PT [facility]   7/18/2017 2:00 PM Cat Royal PT [facility]   7/20/2017 9:00 AM Cat Royal PT [facility]   7/25/2017 2:00 PM Cat Royal PT [facility]   7/27/2017 3:00 PM Cat Royal PT [facility]

## 2017-07-05 ENCOUNTER — HOSPITAL ENCOUNTER (OUTPATIENT)
Dept: PHYSICAL THERAPY | Age: 72
Discharge: HOME | End: 2017-07-05
Payer: COMMERCIAL

## 2017-07-05 ENCOUNTER — APPOINTMENT (OUTPATIENT)
Dept: PHYSICAL THERAPY | Age: 72
End: 2017-07-05
Payer: COMMERCIAL

## 2017-07-05 PROCEDURE — 97110 THERAPEUTIC EXERCISES: CPT

## 2017-07-05 PROCEDURE — 97530 THERAPEUTIC ACTIVITIES: CPT

## 2017-07-05 PROCEDURE — 97162 PT EVAL MOD COMPLEX 30 MIN: CPT

## 2017-07-05 PROCEDURE — G8978 MOBILITY CURRENT STATUS: HCPCS

## 2017-07-05 PROCEDURE — G8979 MOBILITY GOAL STATUS: HCPCS

## 2017-07-05 PROCEDURE — G8980 MOBILITY D/C STATUS: HCPCS

## 2017-07-05 NOTE — PROGRESS NOTES
PHYSICAL THERAPY - DAILY TREATMENT NOTE    Patient Name: Sharifa Nelson        Date: 2017  : 1945   YES Patient  Verified  Visit #:     Insurance: Payor: Raven Correa / Plan: [place] MEDICARE PART A & B / Product Type: Medicare /      In time: 2:00 Out time: 3:00   Total Treatment Time: 60     Medicare Time Tracking (below)   Total Timed Codes (min):  60 1:1 Treatment Time:  60     TREATMENT AREA =  L/S, SIJ, R hip  SUBJECTIVE    Pain Level (on 0 to 10 scale):  1-2  / 10   Medication Changes/New allergies or changes in medical history, any new surgeries or procedures? YES    If yes, update Summary List   Subjective Functional Status/Changes:  []  No changes reported     History of Condition: Patient reports R sided LBP began 17, insidious onset. Patient had driven for some distance that day, but unsure if that is cause. Patient is currently being treated for R IVANIA. Patient underwent x-rays on L/S in January which determined no significant degeneration. Patient reports pain is primarily over R PSIS, interm across to L side of back.     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#, standing 8 hours, twisting/turning, opening airplane doors); gardening and shopping    Pt Goals: \"I want to be me again, be able to walk and work and carry on normal activities without extreme pain\"     FOTO:33     LOW BACK EVALUATION    Objective:      Gait: amb with RW, slight trendelenberg on R without use of AD    Posture: increased kyphosis, rounded shoulders, forward    Palpation/Sensation: TTP R SIJ    (N - normal; R - reduced; MR - markedly reduced)       L/S ROM      Range   Effect  Strength (MMT)          Right        Left    Flex 50%  Psoas (L2,3) 4- 4   Ext 25%  Quads (L3) 5 5   R-lat flex 75%  Ant tib(L4) 5 4+   L-lat flex 75%  Hip Ext (S1, S2) NT NT   R-rot <25%  Glut Med(L5) Unable due to R hip pain 3-   L-rot <25%  Hamstrings(S1,S2) 5 5      Hip IR/ER       Strength (MMT)       Right     Left          Core: Sup Bridge Fair+ Fair+   Core: Side Bridge     Core: Prone plank       Special Tests                       Right     Left          Flexibility         Right              Left    Slump   90/90 HS -29 -23   SLR   Ely        Gregorio     Sl screen 3/5=70% LR   Carolyn     Gaenslen test   Hip IR (prone)     James/ELENA sign   Hip ER (prone)     Thigh thrust        Distraction        Lateral Compression        Sacral Provocation          Effect of:  DKC    Supine Bridge    SL Supine Bridge    Prone on Elbows    RFIS    REIL          SI Symmetry:    Standing        Supine            Prone                Dynamic      +/- R/L  ASIS    Fwd Flex    IC    Stork    PSIS    Seated FF      Sup to Sit: + R anterior innominate    15 min Therapeutic Exercise:  [x]  See flow sheet   Rationale:      increase ROM, increase strength and improve coordination to improve the patients ability to perform ADL's and amb with decreased pain and improved ease     15 min Therapeutic Activity: FOTO   Rationale:   Determine functional limitations     min Patient Education:  []  Review HEP   []  Progressed/Changed HEP based on:   HEP initiated      Other Objective/Functional Measures:    See above information     Post Treatment Pain Level (on 0 to 10) scale:   2  / 10     ASSESSMENT    Assessment/Changes in Function:     Justification for Eval Code Complexity:  Patient History (low 0, mod 1-2, high 3-4): R IVANIA, osteopenia, sarcoidosis, HTN, visual impairment, incontinence, Hx appendectomy, Hx ovary removal, allergies, arthritis, back pain, GI disease, kidney/bladder/urination problems, previous accidents  Examination (low 1-2, mod 3+, high 4+):   Clinical Presentation (low stable or uncomplicated, mod evolving or changing, high unstable or unpredictable):   Clinical Decision Making (low , mod 26-74, high 1-25): FOTO     See PoC     []  See Progress Note/Recertification   Patient will continue to benefit from skilled PT services to modify and progress therapeutic interventions, address functional mobility deficits, address ROM deficits, address strength deficits, analyze and address soft tissue restrictions, analyze and cue movement patterns, analyze and modify body mechanics/ergonomics and assess and modify postural abnormalities to attain remaining goals.    Progress toward goals / Updated goals:    Goals established, see PoC     PLAN    [x]  Upgrade activities as tolerated YES Continue plan of care   []  Discharge due to :    [x]  Other: Initiate PoC     Therapist: Fabrice Rosa PT    Date: 7/5/2017 Time: 2:01 PM

## 2017-07-05 NOTE — PROGRESS NOTES
[facility] PHYSICAL THERAPY  [address] [place] [address] Olivia Pappas, [address] - Phone: [phone]  Fax: [phone] / [address] [facility]  Patient Name: Rosie Son : 1945   Medical   Diagnosis: Low back pain [M54.5] Treatment Diagnosis: R SIJ dysfunction   Onset Date: 17     Referral Source: MD Dr. 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   ===========================================================================================  Assessment / key information:  Rosie Son is a 81 y.o.  female with Dx: Low back pain [M54.5], signs and symptoms consistent with R SIJ dysfunction. Patient reports insidious onset of R sided low back pain on 17. Patient reports she also had instances of LBP in  and 2017, per MD notes pain was left sided in April, and she received trigger point injections from pain management. Patient reports pain at that time was due to work injury, and perseverates on fear that this flare up of pain is related to previous incident. Objective: Patient presents with slight trendelenberg on R when amb without AD, impaired posture, and decreased lumbar ROM and LE strength.  Patient refuses prone lying and R sidelying at this time, due to increased right hip pain in that position. Patient is TTP over R SIJ, with + supine to long sit test for R anterior innominate. Patient also with + 90/90 HS test B (R -29, L -23). L/S ROM      Range   Effect  Strength (MMT)          Right        Left    Flex 50%   Psoas (L2,3) 4- 4   Ext 25%   Quads (L3) 5 5   R-lat flex 75%   Ant tib(L4) 5 4+   L-lat flex 75%   Hip Ext (S1, S2) NT NT   R-rot <25%   Glut Med(L5) Unable due to R hip pain 3-   L-rot <25%   Hamstrings(S1,S2) 5 5      FOTO score 33 points indicating 67% limitation in functional ability. Patient would benefit from skilled PT to address the below listed impairments.  Thank you for your referral.  ===========================================================================================  Eval Complexity: History: HIGH Complexity :3+ comorbidities / personal factors will impact the outcome/ POC Exam:HIGH Complexity : 4+ Standardized tests and measures addressing body structure, function, activity limitation and / or participation in recreation  Presentation: MEDIUM Complexity : Evolving with changing characteristics  Clinical Decision Making:MEDIUM Complexity : FOTO score of 26-74Overall Complexity:MEDIUM  Problem List: pain affecting function, decrease ROM, decrease strength, impaired gait/ balance, decrease ADL/ functional abilitiies, decrease activity tolerance, decrease flexibility/ joint mobility and decrease transfer abilities   Treatment Plan may include any combination of the following: Therapeutic exercise, Therapeutic activities, Neuromuscular re-education, Physical agent/modality, Gait/balance training, Manual therapy, Aquatic therapy, Patient education, Self Care training, Functional mobility training, Home safety training and Stair training  Patient / Family readiness to learn indicated by: asking questions, trying to perform skills and interest  Persons(s) to be included in education: patient (P)  Barriers to Learning/Limitations: None  Measures taken: na   Patient Goal (s): \"I want to be me again, be able to walk and work and carry on normal activities without extreme pain\"   Patient self reported health status: good  Rehabilitation Potential: good   Short Term Goals: To be accomplished in  2-3  weeks:  1. Patient will demonstrate compliance with HEP for symptom management at home. 2. Patient will demonstrate - supine to long sit test to correct pelvic obliquity and decrease symptoms with ADL's.  3. Patient will improve B HS flexibility to -20 deg to decrease stress on spine.  Long Term Goals: To be accomplished in  4-6  weeks:  1. Patient will be independent with HEP to self-manage and prevent symptoms upon DC. 2.  Patient will improve FOTO score to 56 points to indicate improved functional status. 3.  Patient will improve B hip abd strength to 3+/5 to improve dynamic pelvic stability in gait. 4.  Patient will demonstrate proper lifting mechanics with 30# without increased symptoms to prepare for RTW. Ongoing goals for R IVANIA per Progress Note dated 6/30/17  New Goals to be achieved in __4__  weeks:  1. Patient will be independent with HEP in order to demonstrate ability to perform therapeutic exercises and continue progressing functional mobility upon D/C  2. Patient will perform 5 x sit to stand independently and equal LE WB in order to improve independence with functional mobility  3. Patient will negotiate 10 steps with reciprocal pattern w/ LRAD and use of unilateral hand rail in order to improve safety with home/community navigation  4. Patient will demonstrate R hip flexion AROM of 110 degrees in order to improve ease functional mobility  5.  Patient will improve FOTO score to 60/100 to demonstrate a meaningful improvement in functional mobility and increased quality of life    Frequency / Duration:   Patient to be seen  2-3  times per week for 4-6  weeks:  Patient / Caregiver education and instruction: self care, activity modification and exercises  G-Codes (GP): Mobility [id number] Current  CL= 60-79%   Goal  CK= 40-59%  D/C  CL= 60-79%. The severity rating is based on the FOTO Score    Therapist Signature: Javy Mahmood PT Date: 5/3/9512   Certification Period: 5/31/17-8/30/17 Time: 2:01 PM   ===========================================================================================  I certify that the above Physical Therapy Services are being furnished while the patient is under my care. I agree with the treatment plan and certify that this therapy is necessary. Physician Signature:        Date:       Time:     Please sign and return to [employer] or you may fax the signed copy to [phone]. Thank you.

## 2017-07-07 ENCOUNTER — HOSPITAL ENCOUNTER (OUTPATIENT)
Dept: PHYSICAL THERAPY | Age: 72
Discharge: HOME | End: 2017-07-07
Payer: COMMERCIAL

## 2017-07-07 PROCEDURE — 97110 THERAPEUTIC EXERCISES: CPT

## 2017-07-07 NOTE — PROGRESS NOTES
PHYSICAL THERAPY - DAILY TREATMENT NOTE    Patient Name: Annamaria Cantu        Date: 2017  : 1945   YES Patient  Verified  Visit #:     Insurance: Payor: Barbi Ulmer / Plan: [place] / Product Type: Medicare /      In time: 10:25 Out time: 11:35   Total Treatment Time: 70     Medicare Time Tracking (below)   Total Timed Codes (min):  60 1:1 Treatment Time:  45     TREATMENT AREA =  Bilateral hip pain [M25.551, M25.552]  Acute postoperative pain of hip [M25.559, G89.18]  Lower back pain [M54.5]    SUBJECTIVE    Pain Level (on 0 to 10 scale):  2  / 10   Medication Changes/New allergies or changes in medical history, any new surgeries or procedures? NO     If yes, update Summary List   Subjective Functional Status/Changes:  []  No changes reported     \"I thought about everything we talked about. I was due to go back to work Aug 1, but there is no way. I changed my appt with Dr Amarilis Anders to  to try to extend that. I went to the appointment today, however Dr. Gerardo Castillo was caught in traffic and they wanted me to see the PA but I rescheduled instead. I know my hip is not ready to go back to work, so I am going to try to extend my disability based on that. \"          OBJECTIVE    Modalities Rationale:  palliative      min [] Estim, type/location:                                      []  att     []  unatt     []  w/US     []  w/ice    []  w/heat    min []  Mechanical Traction: type/lbs                   []  pro   []  sup   []  int   []  cont    []  before manual    []  after manual    min []  Ultrasound, settings/location:      min []  Iontophoresis w/ dexamethasone, location:                                               []  take home patch       []  in clinic   10 min [x]  Ice     []  Heat    location/position: Supine with LE wedge    min []  Vasopneumatic Device, press/temp:     min []  Other:    [x] Skin assessment post-treatment (if applicable):   [x]  intact    [] redness- no adverse reaction     []redness  adverse reaction:      60 (45) min Therapeutic Exercise:  [x]  See flow sheet   Rationale:      increase ROM, increase strength, improve coordination, improve balance and increase proprioception to improve the patients ability to perform ADL's and amb with decreased pain and improved ease      min Patient Education:  YES  Reviewed HEP   []  Progressed/Changed HEP based on:   Patient reports compliance     Other Objective/Functional Measures:    Patient with good tolerance to therex, improved pelvic obliquity this session  Added piriformis stretch and SB core with fair tolerance     Post Treatment Pain Level (on 0 to 10) scale:   3  / 10 \"sore\"     ASSESSMENT    Assessment/Changes in Function:     Patient is progressing well with therex at this time     []  See Progress Note/Recertification   Patient will continue to benefit from skilled PT services to modify and progress therapeutic interventions, address functional mobility deficits, address ROM deficits, address strength deficits, analyze and address soft tissue restrictions, analyze and cue movement patterns, analyze and modify body mechanics/ergonomics and assess and modify postural abnormalities to attain remaining goals. Progress toward goals / Updated goals: · Short Term Goals: To be accomplished in  2-3  weeks:  1. Patient will demonstrate compliance with HEP for symptom management at home. 2. Patient will demonstrate - supine to long sit test to correct pelvic obliquity and decrease symptoms with ADL's.  3. Patient will improve B HS flexibility to -20 deg to decrease stress on spine. · Long Term Goals: To be accomplished in  4-6  weeks:  1. Patient will be independent with HEP to self-manage and prevent symptoms upon DC. 2.  Patient will improve FOTO score to 56 points to indicate improved functional status.   3.  Patient will improve B hip abd strength to 3+/5 to improve dynamic pelvic stability in gait.  4.  Patient will demonstrate proper lifting mechanics with 30# without increased symptoms to prepare for RTW.      PLAN    [x]  Upgrade activities as tolerated YES Continue plan of care   []  Discharge due to :    []  Other:      Therapist: Florencio Meek PT    Date: 7/7/2017 Time: 10:10 AM     Future Appointments  Date Time Provider Yocasta Loaiza   7/7/2017 10:30 AM Florencio Meek PT [facility]   7/11/2017 10:30 AM Florencio Meek PT [facility]   7/13/2017 9:00 AM Florencio Meek PT [facility]   7/18/2017 2:00 PM Florencio Meek PT [facility]   7/20/2017 9:00 AM Florencio Meek PT [facility]   7/25/2017 2:00 PM Florencio Meek PT [facility]   7/27/2017 3:00 PM Florencio Meek PT [facility]

## 2017-07-11 ENCOUNTER — HOSPITAL ENCOUNTER (OUTPATIENT)
Dept: PHYSICAL THERAPY | Age: 72
Discharge: HOME | End: 2017-07-11
Payer: COMMERCIAL

## 2017-07-11 PROCEDURE — 97110 THERAPEUTIC EXERCISES: CPT

## 2017-07-11 NOTE — PROGRESS NOTES
PHYSICAL THERAPY - DAILY TREATMENT NOTE    Patient Name: Loi Morales        Date: 2017  : 1945   YES Patient  Verified  Visit #:   15   of   23  Insurance: Payor: Nery Plain / Plan: [place] MEDICARE PART A & B / Product Type: Medicare /      In time: 10:20 Out time: 11:25   Total Treatment Time: 65     Medicare Time Tracking (below)   Total Timed Codes (min):  57 1:1 Treatment Time:  57     TREATMENT AREA =  Bilateral hip pain [M25.551, M25.552]  Acute postoperative pain of hip [M25.559, G89.18]  Low back pain [M54.5]    SUBJECTIVE    Pain Level (on 0 to 10 scale):  0  / 10   Medication Changes/New allergies or changes in medical history, any new surgeries or procedures? NO     If yes, update Summary List   Subjective Functional Status/Changes:  []  No changes reported     \"I have no pain today! I did have some pain last night, I was sleeping on my back and if I bent one knee I bent them both like you said. I am going to Dr. Armaan Hurtado on the , I really need to be out of work until September. Do you think I will be able to hold out that long? I am 72 and can't do all this bending and lifting and twisting anymore. I really want to be out until September. I still can't even lay on this hip! I was wondering about doing more weight bearing exercise, I have osteopenia and I heard that is good for it. Can I walk on the TM? \"          OBJECTIVE    Modalities Rationale:  palliative      min [] Estim, type/location:                                      []  att     []  unatt     []  w/US     []  w/ice    []  w/heat    min []  Mechanical Traction: type/lbs                   []  pro   []  sup   []  int   []  cont    []  before manual    []  after manual    min []  Ultrasound, settings/location:      min []  Iontophoresis w/ dexamethasone, location:                                               []  take home patch       []  in clinic   8 min [x]  Ice     []  Heat    location/position: Supine with LE wedge min []  Vasopneumatic Device, press/temp:     min []  Other:    [x] Skin assessment post-treatment (if applicable):   [x]  intact    []  redness- no adverse reaction     []redness  adverse reaction:      57 min Therapeutic Exercise:  [x]  See flow sheet   Rationale:      increase ROM, increase strength, improve coordination, improve balance and increase proprioception to improve the patients ability to perform ADL's and amb with decreased pain and improved ease      min Patient Education:  YES  Reviewed HEP   []  Progressed/Changed HEP based on:   Patient reports compliance     Other Objective/Functional Measures:    Discussed therapy process with patient, and need for medical necessity to continue, however that patient also must be making progress to continue with therapy, verbalized understanding  Discussed WBing ex's with patient, and plan to trial amb on TM NV  Progressing standing therex with good tolerance  Near constant cueing for form with minisquats, particularly to avoid valgus collapse and projection of knees over toes; patient reported ant knee pain which abolished with improved form  Cues to extend knee/drive through quad with step-ups on R, required BUE assist     Post Treatment Pain Level (on 0 to 10) scale:   3-4  / 10     ASSESSMENT    Assessment/Changes in Function:     Patient progressed standing therex this visit with fair tolerance, however frequent cueing required. Patient continues to demonstrate deficits with transfers/steps     []  See Progress Note/Recertification   Patient will continue to benefit from skilled PT services to modify and progress therapeutic interventions, address functional mobility deficits, address ROM deficits, address strength deficits, analyze and address soft tissue restrictions, analyze and cue movement patterns, analyze and modify body mechanics/ergonomics and assess and modify postural abnormalities to attain remaining goals.    Progress toward goals / Updated goals:    · Short Term Goals: To be accomplished in  2-3  weeks:  1. Patient will demonstrate compliance with HEP for symptom management at home. 2. Patient will demonstrate - supine to long sit test to correct pelvic obliquity and decrease symptoms with ADL's. progressing  3. Patient will improve B HS flexibility to -20 deg to decrease stress on spine. Addressed with HS stretch  · Long Term Goals: To be accomplished in  4-6  weeks:  1.  Patient will be independent with HEP to self-manage and prevent symptoms upon DC. 2.  Patient will improve FOTO score to 56 points to indicate improved functional status. 3.  Patient will improve B hip abd strength to 3+/5 to improve dynamic pelvic stability in gait. 4.  Patient will demonstrate proper lifting mechanics with 30# without increased symptoms to prepare for RTW. Ongoing goals for R IVANIA per Progress Note dated 6/30/17  New Goals to be achieved in __4__  weeks:  1. Patient will be independent with HEP in order to demonstrate ability to perform therapeutic exercises and continue progressing functional mobility upon D/C  2. Patient will perform 5 x sit to stand independently and equal LE WB in order to improve independence with functional mobility  3. Patient will negotiate 10 steps with reciprocal pattern w/ LRAD and use of unilateral hand rail in order to improve safety with home/community navigation  4. Patient will demonstrate R hip flexion AROM of 110 degrees in order to improve ease functional mobility  5.  Patient will improve FOTO score to 60/100 to demonstrate a meaningful improvement in functional mobility and increased quality of life     PLAN    [x]  Upgrade activities as tolerated YES Continue plan of care   []  Discharge due to :    []  Other:      Therapist: Aleksander Colón PT    Date: 7/11/2017 Time: 10:11 AM     Future Appointments  Date Time Provider Yocasta Loaiza   7/11/2017 10:30 AM Aleksander Colón PT [facility]   7/11/2017 3:00 PM Beena Dennis Cuauhtemoc Roshan, [address]   7/13/2017 9:00 AM Daniela Sheets, [address] [address]   7/18/2017 2:00 PM Daniela Sheets, PT [facility] AT [address]   7/20/2017 9:00 AM Daniela Sheets, PT [address]   7/25/2017 2:00 PM Daniela Sheets, PT [facility] AT [address]   7/27/2017 3:00 PM Daniela Sheets, PT [address]

## 2017-07-13 ENCOUNTER — HOSPITAL ENCOUNTER (OUTPATIENT)
Dept: PHYSICAL THERAPY | Age: 72
Discharge: HOME | End: 2017-07-13
Payer: COMMERCIAL

## 2017-07-13 PROCEDURE — 97140 MANUAL THERAPY 1/> REGIONS: CPT

## 2017-07-13 PROCEDURE — 97110 THERAPEUTIC EXERCISES: CPT

## 2017-07-13 NOTE — PROGRESS NOTES
PHYSICAL THERAPY - DAILY TREATMENT NOTE    Patient Name: Rosie Son        Date: 2017  : 1945   YES Patient  Verified  Visit #:     Insurance: Payor: Juarez Chaudhry / Plan: [place] MEDICARE PART A & B / Product Type: Medicare /      In time: 9:00 Out time: 10:00   Total Treatment Time: 60     Medicare Time Tracking (below)   Total Timed Codes (min):  60 1:1 Treatment Time:  45     TREATMENT AREA =  Bilateral hip pain [M25.551, M25.552]  Acute postoperative pain of hip [M25.559, G89.18]  Low back pain [M54.5]    SUBJECTIVE    Pain Level (on 0 to 10 scale):  2-3  / 10   Medication Changes/New allergies or changes in medical history, any new surgeries or procedures? NO     If yes, update Summary List   Subjective Functional Status/Changes:  []  No changes reported     \"I was doing ok and I tried to do the minisquats as I was going to the toilet and tried to hold it without having anything to hold onto and I had a lot of pain and sat down. I am going to the doctor tomorrow to see if I can get my disability extended past Aug 1. \"          OBJECTIVE      45 (30) min Therapeutic Exercise:  [x]  See flow sheet   Rationale:      increase ROM, increase strength, improve coordination, improve balance and increase proprioception to improve the patients ability to perform ADL's and amb with decreased pain and improved ease     15 min Therapeutic Activity: Stair negotiation, step-ups, minisquats, bridges   Rationale:   Improve safety and ease with stairs, transfers, and bed mobility     min Patient Education:  YES  Reviewed HEP   []  Progressed/Changed HEP based on:   Patient reports compliance     Other Objective/Functional Measures:    Patient with good tolerance to therex, continues to require mod-max cueing, especially with minisquats  Very challenged with reciprocal stair negotiation with BUE assist, able to complete with mod A  Continues to perseverate on fear of RTW  Advised patient to only perform minisquats with UE assist due to safety     Post Treatment Pain Level (on 0 to 10) scale:   2-3  / 10     ASSESSMENT    Assessment/Changes in Function:     Patient continues to be limited by muscle soreness, fear, and perseveration on fear of RTW     []  See Progress Note/Recertification   Patient will continue to benefit from skilled PT services to modify and progress therapeutic interventions, address functional mobility deficits, address ROM deficits, address strength deficits, analyze and address soft tissue restrictions, analyze and cue movement patterns, analyze and modify body mechanics/ergonomics and assess and modify postural abnormalities to attain remaining goals. Progress toward goals / Updated goals: · Short Term Goals: To be accomplished in  2-3  weeks:  1. Patient will demonstrate compliance with HEP for symptom management at home. 2. Patient will demonstrate - supine to long sit test to correct pelvic obliquity and decrease symptoms with ADL's. progressing  3. Patient will improve B HS flexibility to -20 deg to decrease stress on spine. Addressed with HS stretch  · Long Term Goals: To be accomplished in  4-6  weeks:  1.  Patient will be independent with HEP to self-manage and prevent symptoms upon DC. 2.  Patient will improve FOTO score to 56 points to indicate improved functional status. 3.  Patient will improve B hip abd strength to 3+/5 to improve dynamic pelvic stability in gait. 4.  Patient will demonstrate proper lifting mechanics with 30# without increased symptoms to prepare for RTW.     Ongoing goals for R IVANIA per Progress Note dated 6/30/17  New Goals to be achieved in __4__  weeks:  1. Patient will be independent with HEP in order to demonstrate ability to perform therapeutic exercises and continue progressing functional mobility upon D/C  2.  Patient will perform 5 x sit to stand independently and equal LE WB in order to improve independence with functional mobility addressed with kevin  3. Patient will negotiate 10 steps with reciprocal pattern w/ LRAD and use of unilateral hand rail in order to improve safety with home/community navigation addressed with step-ups and stair negotiation  4. Patient will demonstrate R hip flexion AROM of 110 degrees in order to improve ease functional mobility  5.  Patient will improve FOTO score to 60/100 to demonstrate a meaningful improvement in functional mobility and increased quality of life     PLAN    [x]  Upgrade activities as tolerated YES Continue plan of care   []  Discharge due to :    []  Other:      Therapist: Christine Yanez PT    Date: 7/13/2017 Time: 9:13 AM     Future Appointments  Date Time Provider Yocasta Loaiza   7/18/2017 2:00 PM Christine Yanez PT [facility]   7/20/2017 9:00 AM Christine Yanez [address] [place] [address]   7/25/2017 2:00 PM Christine Yanez PT [facility]   7/27/2017 3:00 PM Christine Yanez [address] [place] [address]   10/11/2017 10:00 AM Michael Bradshaw MD [facility]

## 2017-07-18 ENCOUNTER — APPOINTMENT (OUTPATIENT)
Dept: PHYSICAL THERAPY | Age: 72
End: 2017-07-18
Payer: COMMERCIAL

## 2017-07-20 ENCOUNTER — HOSPITAL ENCOUNTER (OUTPATIENT)
Dept: PHYSICAL THERAPY | Age: 72
Discharge: HOME | End: 2017-07-20
Payer: COMMERCIAL

## 2017-07-20 PROCEDURE — 97530 THERAPEUTIC ACTIVITIES: CPT

## 2017-07-20 PROCEDURE — 97110 THERAPEUTIC EXERCISES: CPT

## 2017-07-20 NOTE — PROGRESS NOTES
PHYSICAL THERAPY - DAILY TREATMENT NOTE    Patient Name: Shalonda Edwards        Date: 2017  : 1945   YES Patient  Verified  Visit #:   15   of   25  Insurance: Payor: Mesfin Staff / Plan: [place] MEDICARE PART A & B / Product Type: Medicare /      In time: 9:00 Out time: 9:57   Total Treatment Time: 57     Medicare Time Tracking (below)   Total Timed Codes (min):  47 1:1 Treatment Time:  42     TREATMENT AREA =  Bilateral hip pain [M25.551, M25.552]  Acute postoperative pain of hip [M25.559, G89.18]  Low back pain [M54.5]    SUBJECTIVE    Pain Level (on 0 to 10 scale):  0  / 10   Medication Changes/New allergies or changes in medical history, any new surgeries or procedures? NO     If yes, update Summary List   Subjective Functional Status/Changes:  []  No changes reported     \"I did all my exercises at home yesterday, a few hours later the muscles in my legs were so stiff. I sat down and relaxed and it did go away. \"          OBJECTIVE    Modalities Rationale:  palliative      min [] Estim, type/location:                                      []  att     []  unatt     []  w/US     []  w/ice    []  w/heat    min []  Mechanical Traction: type/lbs                   []  pro   []  sup   []  int   []  cont    []  before manual    []  after manual    min []  Ultrasound, settings/location:      min []  Iontophoresis w/ dexamethasone, location:                                               []  take home patch       []  in clinic   10 min [x]  Ice     []  Heat    location/position: Supine with LE wedge to R hip and L/S    min []  Vasopneumatic Device, press/temp:     min []  Other:    [x] Skin assessment post-treatment (if applicable):   [x]  intact    []  redness- no adverse reaction     []redness  adverse reaction:      32 (27) min Therapeutic Exercise:  [x]  See flow sheet   Rationale:      increase ROM, increase strength, improve coordination, improve balance and increase proprioception to improve the patients ability to perform ADL's and amb with decreased pain and improved ease     15 min Therapeutic Activity: Step-ups, stairs, minisquat, clams, bridges   Rationale:   Improve safety and ease with bed mobility, transfers, and stair negotiation     min Patient Education:  YES  Reviewed HEP   []  Progressed/Changed HEP based on:   Patient reports compliance     Other Objective/Functional Measures:    Patient continues with min-mod cueing for form with therex  Patient reports performing stretching at beginning of HEP, advised to perform at the end to relieve stiffness  Mod cueing with minisquats  Continues to be challenged with reciprocal stair negotiation     Post Treatment Pain Level (on 0 to 10) scale:   <1  / 10     ASSESSMENT    Assessment/Changes in Function:     Patient is progressing slowly at this time     []  See Progress Note/Recertification   Patient will continue to benefit from skilled PT services to modify and progress therapeutic interventions, address functional mobility deficits, address ROM deficits, address strength deficits, analyze and address soft tissue restrictions, analyze and cue movement patterns, analyze and modify body mechanics/ergonomics and assess and modify postural abnormalities to attain remaining goals. Progress toward goals / Updated goals: · Short Term Goals: To be accomplished in  2-3  weeks:  1. Patient will demonstrate compliance with HEP for symptom management at home. 2. Patient will demonstrate - supine to long sit test to correct pelvic obliquity and decrease symptoms with ADL's. progressing  3. Patient will improve B HS flexibility to -20 deg to decrease stress on spine. Addressed with HS stretch  · Long Term Goals: To be accomplished in  4-6  weeks:  1.  Patient will be independent with HEP to self-manage and prevent symptoms upon DC. 2.  Patient will improve FOTO score to 56 points to indicate improved functional status.   3.  Patient will improve B hip abd strength to 3+/5 to improve dynamic pelvic stability in gait. 4.  Patient will demonstrate proper lifting mechanics with 30# without increased symptoms to prepare for RTW.      Ongoing goals for R IVANIA per Progress Note dated 6/30/17  New Goals to be achieved in __4__  weeks:  1. Patient will be independent with HEP in order to demonstrate ability to perform therapeutic exercises and continue progressing functional mobility upon D/C  2. Patient will perform 5 x sit to stand independently and equal LE WB in order to improve independence with functional mobility addressed with minisquats  3. Patient will negotiate 10 steps with reciprocal pattern w/ LRAD and use of unilateral hand rail in order to improve safety with home/community navigation addressed with step-ups and stair negotiation  4. Patient will demonstrate R hip flexion AROM of 110 degrees in order to improve ease functional mobility  5.  Patient will improve FOTO score to 60/100 to demonstrate a meaningful improvement in functional mobility and increased quality of life     PLAN    [x]  Upgrade activities as tolerated YES Continue plan of care   []  Discharge due to :    []  Other:      Therapist: Christine Yanez PT    Date: 7/20/2017 Time: 9:05 AM     Future Appointments  Date Time Provider Yocasta Loaiza   7/25/2017 2:00 PM Christine Yanez PT [facility]   7/27/2017 3:00 PM Christine Yanez, [address] [place] [address]   10/11/2017 10:00 AM Michael Bradshaw MD [facility]

## 2017-07-25 ENCOUNTER — HOSPITAL ENCOUNTER (OUTPATIENT)
Dept: PHYSICAL THERAPY | Age: 72
Discharge: HOME | End: 2017-07-25
Payer: COMMERCIAL

## 2017-07-25 PROCEDURE — 97110 THERAPEUTIC EXERCISES: CPT

## 2017-07-25 NOTE — PROGRESS NOTES
PHYSICAL THERAPY - DAILY TREATMENT NOTE    Patient Name: Radha Shirley        Date: 2017  : 1945   YES Patient  Verified  Visit #:     Insurance: Payor: Elio Richardson / Plan: [place] MED[place]ARE PART A & B / Product Type: Medicare /      In time: 1:33 Out time: 2:31   Total Treatment Time: 58     Medicare Time Tracking (below)   Total Timed Codes (min):  48 1:1 Treatment Time:  45     TREATMENT AREA =  Bilateral hip pain [M25.551, M25.552]  Acute postoperative pain of hip [M25.559, G89.18]  Low back pain [M54.5]    SUBJECTIVE    Pain Level (on 0 to 10 scale):  0  / 10   Medication Changes/New allergies or changes in medical history, any new surgeries or procedures? NO     If yes, update Summary List   Subjective Functional Status/Changes:  []  No changes reported     \"Every time I get up from driving the car I feel it big time. \"          OBJECTIVE    Modalities Rationale:  palliative      min [] Estim, type/location:                                      []  att     []  unatt     []  w/US     []  w/ice    []  w/heat    min []  Mechanical Traction: type/lbs                   []  pro   []  sup   []  int   []  cont    []  before manual    []  after manual    min []  Ultrasound, settings/location:      min []  Iontophoresis w/ dexamethasone, location:                                               []  take home patch       []  in clinic   10 min [x]  Ice     []  Heat    location/position: Supine with LE wedge to R hip and L/S    min []  Vasopneumatic Device, press/temp:     min []  Other:    [x] Skin assessment post-treatment (if applicable):   [x]  intact    []  redness- no adverse reaction     []redness  adverse reaction:      48 (45) min Therapeutic Exercise:  [x]  See flow sheet   Rationale:      increase ROM, increase strength, improve coordination, improve balance and increase proprioception to improve the patients ability to perform ADL's and amb with decreased pain and improved ease min Patient Education:  YES  Reviewed HEP   []  Progressed/Changed HEP based on:   Patient reports compliance     Other Objective/Functional Measures:    Improving form with minisquats this session  Progressed step-ups to 6\" with fair tolerance, however required BUE assist  Unable to perform log roll with proper form despite max cueing and demonstration, c/o R hip pain while lying on L side     Post Treatment Pain Level (on 0 to 10) scale:   3  / 10     ASSESSMENT    Assessment/Changes in Function:     Patient progressing slowly at this time     []  See Progress Note/Recertification   Patient will continue to benefit from skilled PT services to modify and progress therapeutic interventions, address functional mobility deficits, address ROM deficits, address strength deficits, analyze and address soft tissue restrictions, analyze and cue movement patterns, analyze and modify body mechanics/ergonomics and assess and modify postural abnormalities to attain remaining goals. Progress toward goals / Updated goals: · Short Term Goals: To be accomplished in  2-3  weeks:  1. Patient will demonstrate compliance with HEP for symptom management at home. 2. Patient will demonstrate - supine to long sit test to correct pelvic obliquity and decrease symptoms with ADL's. progressing  3. Patient will improve B HS flexibility to -20 deg to decrease stress on spine. Addressed with HS stretch  · Long Term Goals: To be accomplished in  4-6  weeks:  1.  Patient will be independent with HEP to self-manage and prevent symptoms upon DC. 2.  Patient will improve FOTO score to 56 points to indicate improved functional status. 3.  Patient will improve B hip abd strength to 3+/5 to improve dynamic pelvic stability in gait.   4.  Patient will demonstrate proper lifting mechanics with 30# without increased symptoms to prepare for RTW.      Ongoing goals for R IVANIA per Progress Note dated 6/30/17  New Goals to be achieved in __4__  weeks:  1. Patient will be independent with HEP in order to demonstrate ability to perform therapeutic exercises and continue progressing functional mobility upon D/C  2. Patient will perform 5 x sit to stand independently and equal LE WB in order to improve independence with functional mobility addressed with minisquats  3. Patient will negotiate 10 steps with reciprocal pattern w/ LRAD and use of unilateral hand rail in order to improve safety with home/community navigation addressed with step-ups and stair negotiation  4. Patient will demonstrate R hip flexion AROM of 110 degrees in order to improve ease functional mobility  5.  Patient will improve FOTO score to 60/100 to demonstrate a meaningful improvement in functional mobility and increased quality of life     PLAN    [x]  Upgrade activities as tolerated YES Continue plan of care   []  Discharge due to :    []  Other:      Therapist: Eric Phillips PT    Date: 7/25/2017 Time: 1:33 PM     Future Appointments  Date Time Provider Yocasta Loaiza   7/25/2017 2:00 PM Eric Phillips PT [facility]   7/27/2017 3:00 PM Eric Phillips PT [facility]   8/1/2017 10:30 AM Eric Phillips PT [facility]   8/3/2017 9:00 AM Eric Phillips PT [facility]   8/8/2017 10:30 AM Palak [facility]   8/11/2017 11:00 AM [facility]   8/16/2017 9:30 AM Annette Van OSMAN [facility]   8/18/2017 11:00 AM [facility]   8/21/2017 10:00 AM [facility]   8/23/2017 8:30 AM [facility]   8/29/2017 10:00 AM [facility]   8/31/2017 10:00 AM [facility]   10/11/2017 10:00 AM Marie Redmond MD [address] [facility]

## 2017-07-27 ENCOUNTER — HOSPITAL ENCOUNTER (OUTPATIENT)
Dept: PHYSICAL THERAPY | Age: 72
Discharge: HOME | End: 2017-07-27
Payer: COMMERCIAL

## 2017-07-27 PROCEDURE — 97110 THERAPEUTIC EXERCISES: CPT

## 2017-07-27 NOTE — PROGRESS NOTES
PHYSICAL THERAPY - DAILY TREATMENT NOTE    Patient Name: Mikaela Sam        Date: 2017  : 1945   YES Patient  Verified  Visit #:     Insurance: Payor: Lisette Mercado / Plan: [place] MEDICARE PART A & B / Product Type: Medicare /      In time: 2:30 Out time: 3:27   Total Treatment Time: 57     Medicare Time Tracking (below)   Total Timed Codes (min):  47 1:1 Treatment Time:  32     TREATMENT AREA =  Bilateral hip pain [M25.551, M25.552]  Acute postoperative pain of hip [M25.559, G89.18]  Low back pain [M54.5]    SUBJECTIVE    Pain Level (on 0 to 10 scale):  1  / 10   Medication Changes/New allergies or changes in medical history, any new surgeries or procedures? NO     If yes, update Summary List   Subjective Functional Status/Changes:  []  No changes reported     \"I have some soreness from walking yesterday on the crooked pavement for at least 20 minutes. I have access to a treadmill, can I start working on it? I felt the log roll for 2 full days, I had to take aspirin. I did 2 at home, but the bed was softer so it didn't hurt as bad. \"          OBJECTIVE    Modalities Rationale:  palliative      min [] Estim, type/location:                                      []  att     []  unatt     []  w/US     []  w/ice    []  w/heat    min []  Mechanical Traction: type/lbs                   []  pro   []  sup   []  int   []  cont    []  before manual    []  after manual    min []  Ultrasound, settings/location:      min []  Iontophoresis w/ dexamethasone, location:                                               []  take home patch       []  in clinic   10 min [x]  Ice     []  Heat    location/position: Supine with LE wedge to R hip and L/S    min []  Vasopneumatic Device, press/temp:     min []  Other:    [x] Skin assessment post-treatment (if applicable):   [x]  intact    []  redness- no adverse reaction     []redness  adverse reaction:      47 (32) min Therapeutic Exercise:  [x]  See flow sheet Rationale:      increase ROM, increase strength, improve coordination, improve balance and increase proprioception to improve the patients ability to perform ADL's and amb with decreased pain and improved ease      min Patient Education:  YES  Reviewed HEP   []  Progressed/Changed HEP based on:   Patient reports compliance     Other Objective/Functional Measures:    Patient with slight increased pain with step-ups and hip abd, only able to perform 9 reps of abd  Advised patient to continue review of log roll at home  Mod cueing for form with therex     Post Treatment Pain Level (on 0 to 10) scale:   0  / 10     ASSESSMENT    Assessment/Changes in Function:     Patient continues with slow progress towards goals     []  See Progress Note/Recertification   Patient will continue to benefit from skilled PT services to modify and progress therapeutic interventions, address functional mobility deficits, address ROM deficits, address strength deficits, analyze and address soft tissue restrictions, analyze and cue movement patterns, analyze and modify body mechanics/ergonomics and assess and modify postural abnormalities to attain remaining goals. Progress toward goals / Updated goals: · Short Term Goals: To be accomplished in  2-3  weeks:  1. Patient will demonstrate compliance with HEP for symptom management at home. 2. Patient will demonstrate - supine to long sit test to correct pelvic obliquity and decrease symptoms with ADL's. progressing  3. Patient will improve B HS flexibility to -20 deg to decrease stress on spine. Addressed with HS stretch  · Long Term Goals: To be accomplished in  4-6  weeks:  1.  Patient will be independent with HEP to self-manage and prevent symptoms upon DC. 2.  Patient will improve FOTO score to 56 points to indicate improved functional status. 3.  Patient will improve B hip abd strength to 3+/5 to improve dynamic pelvic stability in gait.   4.  Patient will demonstrate proper lifting mechanics with 30# without increased symptoms to prepare for RTW.      Ongoing goals for R IVANIA per Progress Note dated 6/30/17  New Goals to be achieved in __4__  weeks:  1. Patient will be independent with HEP in order to demonstrate ability to perform therapeutic exercises and continue progressing functional mobility upon D/C  2. Patient will perform 5 x sit to stand independently and equal LE WB in order to improve independence with functional mobility addressed with minisquats  3. Patient will negotiate 10 steps with reciprocal pattern w/ LRAD and use of unilateral hand rail in order to improve safety with home/community navigation addressed with step-ups and stair negotiation  4. Patient will demonstrate R hip flexion AROM of 110 degrees in order to improve ease functional mobility  5.  Patient will improve FOTO score to 60/100 to demonstrate a meaningful improvement in functional mobility and increased quality of life     PLAN    [x]  Upgrade activities as tolerated YES Continue plan of care   []  Discharge due to :    []  Other:      Therapist: Marisa Pritchard PT    Date: 7/27/2017 Time: 2:40 PM     Future Appointments  Date Time Provider Yocasta Loaiza   7/27/2017 3:00 PM Marisa Pritchard PT [facility]   8/1/2017 10:30 AM Marisa Pritchard PT [facility]   8/3/2017 9:00 AM Marisa Pritchard PT [facility]   8/8/2017 10:30 AM [facility]   8/11/2017 11:00 AM Amie Esquivel [facility]   8/16/2017 9:30 AM Kee OSMAN [facility]   8/18/2017 11:00 AM [facility]   8/21/2017 10:00 AM Amie Esquivel [facility]   8/23/2017 8:30 AM Amie [facility]   8/29/2017 10:00 AM Amie [facility]   8/31/2017 10:00 AM [facility]   10/11/2017 10:00 AM Bertie MD Luis [address] [facility]

## 2017-08-01 ENCOUNTER — APPOINTMENT (OUTPATIENT)
Dept: PHYSICAL THERAPY | Age: 72
End: 2017-08-01
Payer: COMMERCIAL

## 2017-08-03 ENCOUNTER — HOSPITAL ENCOUNTER (OUTPATIENT)
Dept: PHYSICAL THERAPY | Age: 72
Discharge: HOME | End: 2017-08-03
Payer: COMMERCIAL

## 2017-08-03 PROCEDURE — 97530 THERAPEUTIC ACTIVITIES: CPT

## 2017-08-03 PROCEDURE — 97110 THERAPEUTIC EXERCISES: CPT

## 2017-08-03 PROCEDURE — G8980 MOBILITY D/C STATUS: HCPCS

## 2017-08-03 PROCEDURE — G8979 MOBILITY GOAL STATUS: HCPCS

## 2017-08-03 NOTE — PROGRESS NOTES
PHYSICAL THERAPY - DAILY TREATMENT NOTE    Patient Name: Pan Pollard        Date: 8/3/2017  : 1945   YES Patient  Verified  Visit #:     Insurance: Payor: Alejandro Randolph / Plan: [place] MEDICARE PART A & B / Product Type: Medicare /      In time: 9:00 Out time: 9:55   Total Treatment Time: 54     Medicare Time Tracking (below)   Total Timed Codes (min):  45 1:1 Treatment Time:  45     TREATMENT AREA =  Bilateral hip pain [M25.551, M25.552]  Acute postoperative pain of hip [M25.559, G89.18]  Low back pain [M54.5]    SUBJECTIVE    Pain Level (on 0 to 10 scale):  0  / 10   Medication Changes/New allergies or changes in medical history, any new surgeries or procedures? NO     If yes, update Summary List   Subjective Functional Status/Changes:  []  No changes reported     \"We went to visit our son in [place]. I did a lot of walking so I didn't do my exercises. I have no pain! \"          OBJECTIVE    Modalities Rationale:  palliative      min [] Estim, type/location:                                      []  att     []  unatt     []  w/US     []  w/ice    []  w/heat    min []  Mechanical Traction: type/lbs                   []  pro   []  sup   []  int   []  cont    []  before manual    []  after manual    min []  Ultrasound, settings/location:      min []  Iontophoresis w/ dexamethasone, location:                                               []  take home patch       []  in clinic   10 min [x]  Ice     []  Heat    location/position: Supine with LE wedge    min []  Vasopneumatic Device, press/temp:     min []  Other:    [x] Skin assessment post-treatment (if applicable):   [x]  intact    []  redness- no adverse reaction     []redness  adverse reaction:      35 min Therapeutic Exercise:  [x]  See flow sheet   Rationale:      increase ROM, increase strength, improve coordination, improve balance and increase proprioception to improve the patients ability to perform ADL's and amb with decreased pain and improved ease     10 min Therapeutic Activity: Sit to stand, step-ups, minisquat, TM   Rationale:   Improve safety and ease with home and community mobility     min Patient Education:  YES  Reviewed HEP   []  Progressed/Changed HEP based on:   Patient reports compliance     Other Objective/Functional Measures:    See progress note       Post Treatment Pain Level (on 0 to 10) scale:   0  / 10     ASSESSMENT    Assessment/Changes in Function:     See progress note       []  See Progress Note/Recertification   Patient will continue to benefit from skilled PT services to modify and progress therapeutic interventions, address functional mobility deficits, address ROM deficits, address strength deficits, analyze and address soft tissue restrictions, analyze and cue movement patterns, analyze and modify body mechanics/ergonomics and assess and modify postural abnormalities to attain remaining goals.    Progress toward goals / Updated goals:    See progress note       PLAN    [x]  Upgrade activities as tolerated YES Continue plan of care   []  Discharge due to :    []  Other:      Therapist: Rosa M Khan PT    Date: 8/3/2017 Time: 9:08 AM     Future Appointments  Date Time Provider Yocasta Loaiza   8/8/2017 10:30 AM Estefanía Sinclair [facility]   8/11/2017 11:00 AM Estefanía OSMAN [facility]   8/16/2017 [facility]   8/18/2017 11:00 AM [facility]   8/21/2017 10:00 AM Luke [facility]   8/23/2017 8:30 AM Luke Solorzano [facility]   8/29/2017 10:00 AM [facility]   8/31/2017 10:00 AM [facility]   10/11/2017 10:00 AM MD Saran Bustillos

## 2017-08-08 ENCOUNTER — HOSPITAL ENCOUNTER (OUTPATIENT)
Dept: PHYSICAL THERAPY | Age: 72
Discharge: HOME | End: 2017-08-08
Payer: COMMERCIAL

## 2017-08-08 PROCEDURE — 97110 THERAPEUTIC EXERCISES: CPT

## 2017-08-08 PROCEDURE — G8979 MOBILITY GOAL STATUS: HCPCS

## 2017-08-08 PROCEDURE — G8978 MOBILITY CURRENT STATUS: HCPCS

## 2017-08-08 NOTE — PROGRESS NOTES
PHYSICAL THERAPY - DAILY TREATMENT NOTE      Patient Name: Akash Lea        Date: 2017  : 1945   YES Patient  Verified  Visit #:     Insurance: Payor: Isaac Rios / Plan: [place] MEDICARE PART A & B / Product Type: Medicare /      In time: 10:15 Out time: 11:05   Total Treatment Time: 50     Medicare Time Tracking (below)   Total Timed Codes (min):  40 1:1 Treatment Time:  30     TREATMENT AREA =  Bilateral hip pain [M25.551, M25.552]  Acute postoperative pain of hip [M25.559, G89.18]  Low back pain [M54.5]    SUBJECTIVE    Pain Level (on 0 to 10 scale):  0  / 10   Medication Changes/New allergies or changes in medical history, any new surgeries or procedures? NO    If yes, update Summary List   Subjective Functional Status/Changes:  []  No changes reported     \"I've started walking 15 minutes on the treadmill. \"        OBJECTIVE    Modalities Rationale:        min [] Estim, type/location:                                      []  att     []  unatt     []  w/US     []  w/ice    []  w/heat    min []  Mechanical Traction: type/lbs                   []  pro   []  sup   []  int   []  cont    []  before manual    []  after manual    min []  Ultrasound, settings/location:      min []  Iontophoresis w/ dexamethasone, location:                                               []  take home patch       []  in clinic   10 min [x]  Ice     []  Heat    location/position: Supine to R SIJ BLEs on wedge    min []  Vasopneumatic Device, press/temp:     min []  Other:    [x] Skin assessment post-treatment (if applicable):    [x]  intact    []  redness- no adverse reaction     []redness  adverse reaction:      40 (30) min Therapeutic Exercise:  [x]  See flow sheet   Rationale:      increase ROM, increase strength, improve coordination, improve balance and increase proprioception to improve the patients ability to perform ADL's and functional mobility with increased ease and efficiency of movement 1 min Patient Education:  YES  Reviewed HEP   []  Progressed/Changed HEP based on: Other Objective/Functional Measures:    C/o tightness in anterior thigh with bridges; added modified ezio to address hip flexor/quad tightness  MinVC required for proper form during therex performance  Attempted to perform LLE clams. Pt unable to tolerate R sidelying position so held this and performed H/L abduction     Post Treatment Pain Level (on 0 to 10) scale:   0  / 10     ASSESSMENT    Assessment/Changes in Function:     Patient with improving ambulation tolerance and dec gait impairments. Tolerated therex progression well today     []  See Progress Note/Recertification   Patient will continue to benefit from skilled PT services to modify and progress therapeutic interventions, address functional mobility deficits, address ROM deficits, address strength deficits, analyze and address soft tissue restrictions, analyze and cue movement patterns, analyze and modify body mechanics/ergonomics, assess and modify postural abnormalities, address imbalance/dizziness and instruct in home and community integration to attain remaining goals.      Progress toward goals / Updated goals:    First visit since re-evaluation; no progress noted     PLAN    [x]  Upgrade activities as tolerated YES Continue plan of care   []  Discharge due to :    []  Other:      Therapist: Pamela Pennington DPT    Date: 8/8/2017 Time: 10:53 AM     Future Appointments  Date Time Provider Yocasta Loaiza   8/11/2017 11:00 AM [facility]   8/16/2017 9:30 AM Kee OSMAN [facility]   8/18/2017 11:00 [facility]   8/21/2017 10:00 AM [facility]   8/23/2017 8:30 AM [facility]   8/29/2017 10:00 AM [facility]   8/31/2017 10:00 [facility]   10/11/2017 10:00 AM Jhony Smith MD [address] [facility]

## 2017-08-11 ENCOUNTER — HOSPITAL ENCOUNTER (OUTPATIENT)
Dept: PHYSICAL THERAPY | Age: 72
Discharge: HOME | End: 2017-08-11
Payer: COMMERCIAL

## 2017-08-11 PROCEDURE — 97110 THERAPEUTIC EXERCISES: CPT

## 2017-08-11 NOTE — PROGRESS NOTES
PHYSICAL THERAPY - DAILY TREATMENT NOTE      Patient Name: Mikaela Sam        Date: 2017  : 1945   YES Patient  Verified  Visit #:     Insurance: Payor: Lisette Mercado / Plan: [place] MEDICARE PART A & B / Product Type: Medicare /      In time: 10:40 Out time: 11:30   Total Treatment Time: 50     Medicare Time Tracking (below)   Total Timed Codes (min):  50 1:1 Treatment Time:  50     TREATMENT AREA =  Bilateral hip pain [M25.551, M25.552]  Acute postoperative pain of hip [M25.559, G89.18]  Low back pain [M54.5]    SUBJECTIVE    Pain Level (on 0 to 10 scale):  0  / 10   Medication Changes/New allergies or changes in medical history, any new surgeries or procedures? NO    If yes, update Summary List   Subjective Functional Status/Changes:  []  No changes reported     \" I am good. I walk on the treadmill @2.0 mph or 18 mins and it felt good. \"        OBJECTIVE    50 (50) min Therapeutic Exercise:  [x]  See flow sheet   Rationale:      increase ROM, increase strength, improve balance and increase proprioception to improve the patients ability to perform ADLs with ease and decrease pain with amb.      min Patient Education:  YES  Reviewed HEP   []  Progressed/Changed HEP based on: Other Objective/Functional Measures:    Minimal cueing with therex. Progressed to 6 in step, pt able to complete 5 reps w/ R up/L down. Pt c/o of great discomfort. Performed sit<>stand 2x5 w/ UE assist from 18.5 in mat table. Pt initially hesitant to complete side stepping but able to complete 5 ftx 4 in //bars and 15ftx2 w/o // bars with verbal cues to prevent trunk/hip rotation. Pt tolerated overall session well. Post Treatment Pain Level (on 0 to 10) scale:   0  / 10     ASSESSMENT    Assessment/Changes in Function:     Minimal cueing with therex.  Pt tolerated progression and session well.   []  See Progress Note/Recertification   Patient will continue to benefit from skilled PT services to modify and progress therapeutic interventions, address functional mobility deficits, address ROM deficits, address strength deficits, analyze and address soft tissue restrictions, analyze and cue movement patterns, analyze and modify body mechanics/ergonomics and assess and modify postural abnormalities to attain remaining goals. Progress toward goals / Updated goals:    · Goals for this certification period include and are to be achieved in   4  weeks:  1. Patient will improve B HS flexibility to -20 deg to decrease stress on spine. 2.   Patient will be independent with HEP to self-manage and prevent symptoms upon DC. 3.   Patient will improve lumbar FOTO score to 56 points to indicate improved functional status. 4.   Patient will improve B hip abd strength to 3+/5 to improve dynamic pelvic stability in gait. 5.   Patient will demonstrate proper lifting mechanics with 30# without increased symptoms to prepare for RTW. 6.   Patient will perform 5 x sit to stand independently and equal LE WB in order to improve independence with functional mobility  7.    Patient will negotiate 10 steps with reciprocal pattern w/ LRAD and use of unilateral hand rail in order to improve safety with home/community navigation     PLAN    [x]  Upgrade activities as tolerated YES Continue plan of care   []  Discharge due to :    []  Other:      Therapist: Regina Barry DPT    Date: 8/11/2017 Time: 11:18 AM     Future Appointments  Date Time Provider Yocasta Loaiza   8/16/2017 9:30 AM [facility]   8/18/2017 11:00 AM [facility]   8/21/2017 10:00 AM [facility]   8/23/2017 8:30 AM [facility]   8/29/2017 10:00 AM [facility]   8/31/2017 10:00 AM [facility]   10/11/2017 10:00 AM López Galindo MD [facility]

## 2017-08-16 ENCOUNTER — HOSPITAL ENCOUNTER (OUTPATIENT)
Dept: PHYSICAL THERAPY | Age: 72
Discharge: HOME | End: 2017-08-16
Payer: COMMERCIAL

## 2017-08-16 PROCEDURE — 97530 THERAPEUTIC ACTIVITIES: CPT

## 2017-08-16 PROCEDURE — 97110 THERAPEUTIC EXERCISES: CPT

## 2017-08-16 NOTE — PROGRESS NOTES
PHYSICAL THERAPY - DAILY TREATMENT NOTE      Patient Name: Sharifa Nelson        Date: 2017  : 1945   YES Patient  Verified  Visit #:     Insurance: Payor: Raven Correa / Plan: [place] & B / Product Type: Medicare /      In time: 9:20 Out time: 10:18   Total Treatment Time: 58 min     Medicare Time Tracking (below)   Total Timed Codes (min):  48 1:1 Treatment Time:  48     TREATMENT AREA =  Bilateral hip pain [M25.551, M25.552]  Acute postoperative pain of hip [M25.559, G89.18]  Low back pain [M54.5]    SUBJECTIVE    Pain Level (on 0 to 10 scale):  0  / 10   Medication Changes/New allergies or changes in medical history, any new surgeries or procedures? NO    If yes, update Summary List   Subjective Functional Status/Changes:  []  No changes reported     \"I'm feeling OK today, I'm walking more on the treadmill. \"        OBJECTIVE    Modalities Rationale:   min [] Estim, type/location:                                      []  att     []  unatt     []  w/US     []  w/ice    []  w/heat    min []  Mechanical Traction: type/lbs                   []  pro   []  sup   []  int   []  cont    []  before manual    []  after manual    min []  Ultrasound, settings/location:      min []  Iontophoresis w/ dexamethasone, location:                                               []  take home patch       []  in clinic   10 min [x]  Ice     []  Heat    location/position: R hip/SIJ supine BLEs on wedge    min []  Vasopneumatic Device, press/temp:     min []  Other:    [x] Skin assessment post-treatment (if applicable):    [x]  intact    []  redness- no adverse reaction     []redness  adverse reaction:      38 (28) min Therapeutic Exercise:  [x]  See flow sheet   Rationale:      increase ROM, increase strength, improve coordination and increase proprioception to improve the patients ability to perform ADL's and functional mobility with increased ease and efficiency of movement      10 (10) min Therapeutic Activity: 17# box lifts 2 x 4 reps from floor   Rationale:   increase strength, improve coordination, improve balance and increase proprioception to improve the patients ability to improve ability to perform work tasks. 1 min Patient Education:  YES  Reviewed HEP   []  Progressed/Changed HEP based on: Other Objective/Functional Measures:    17# box lifts from the ground. Pt provided VC to dec anterior knee translation and cues for lat contraction during lifting. Pt with inc difficulty/fatigue but able to complete without any pain reproduction. Post Treatment Pain Level (on 0 to 10) scale:   0  / 10     ASSESSMENT    Assessment/Changes in Function:     Pt performed box lifts (17#) without pain today. Will continue to progress TA as tolerated for return to work duties. []  See Progress Note/Recertification   Patient will continue to benefit from skilled PT services to modify and progress therapeutic interventions, address functional mobility deficits, address ROM deficits, address strength deficits, analyze and address soft tissue restrictions, analyze and cue movement patterns, analyze and modify body mechanics/ergonomics, assess and modify postural abnormalities, address imbalance/dizziness and instruct in home and community integration to attain remaining goals. Progress toward goals / Updated goals:    · Goals for this certification period include and are to be achieved in   4  weeks:  1. Patient will improve B HS flexibility to -20 deg to decrease stress on spine. HS stretching  2. Patient will be independent with HEP to self-manage and prevent symptoms upon DC. 3.   Patient will improve lumbar FOTO score to 56 points to indicate improved functional status. 4.   Patient will improve B hip abd strength to 3+/5 to improve dynamic pelvic stability in gait. LE therex (see flowsheet)  5.    Patient will demonstrate proper lifting mechanics with 30# without increased symptoms to prepare for RTW. Box lifts added today (see above)  6. Patient will perform 5 x sit to stand independently and equal LE WB in order to improve independence with functional mobility  7.    Patient will negotiate 10 steps with reciprocal pattern w/ LRAD and use of unilateral hand rail in order to improve safety with home/community navigation       PLAN    [x]  Upgrade activities as tolerated YES Continue plan of care   []  Discharge due to :    []  Other:      Therapist: Pro Mario DPT    Date: 8/16/2017 Time: 8:20 AM     Future Appointments  Date Time Provider Yocasta Loaiza   8/16/2017 9:30 AM [facility]   8/18/2017 11:00 AM [facility]   8/21/2017 10:00 AM [facility]   8/23/2017 8:30 AM [facility]   8/29/2017 10:00 AM [facility]   8/31/2017 10:00 AM [facility]   10/11/2017 10:00 AM Yue Walters MD [address] [place] [address]

## 2017-08-18 ENCOUNTER — HOSPITAL ENCOUNTER (OUTPATIENT)
Dept: PHYSICAL THERAPY | Age: 72
Discharge: HOME | End: 2017-08-18
Payer: COMMERCIAL

## 2017-08-18 PROCEDURE — 97140 MANUAL THERAPY 1/> REGIONS: CPT

## 2017-08-18 PROCEDURE — 97110 THERAPEUTIC EXERCISES: CPT

## 2017-08-18 PROCEDURE — 97530 THERAPEUTIC ACTIVITIES: CPT

## 2017-08-21 ENCOUNTER — HOSPITAL ENCOUNTER (OUTPATIENT)
Dept: PHYSICAL THERAPY | Age: 72
Discharge: HOME | End: 2017-08-21
Payer: COMMERCIAL

## 2017-08-21 PROCEDURE — 97110 THERAPEUTIC EXERCISES: CPT

## 2017-08-21 NOTE — PROGRESS NOTES
PHYSICAL THERAPY - DAILY TREATMENT NOTE      Patient Name: Loi Morales        Date: 2017  : 1945   YES Patient  Verified  Visit #:     Insurance: Payor: Nery Carrero / Plan: [place] MEDICARE PART A & B / Product Type: Medicare /      In time: 9:34 Out time: 10;19   Total Treatment Time: 45     Medicare Time Tracking (below)   Total Timed Codes (min):  45 1:1 Treatment Time:  38     TREATMENT AREA =  Bilateral hip pain [M25.551, M25.552]  Acute postoperative pain of hip [M25.559, G89.18]  Low back pain [M54.5]    SUBJECTIVE     Pain Level (on 0 to 10 scale):  0  / 10   Medication Changes/New allergies or changes in medical history, any new surgeries or procedures? NO     If yes, update Summary List   Subjective Functional Status/Changes:  []  No changes reported      \"I am good. The steps are very hard today. \"          OBJECTIVE     45 (38) min Therapeutic Exercise:  [x]  See flow sheet   Rationale:      increase ROM, increase strength, improve balance and increase proprioception to improve the patients ability to perform ADLs with ease and decrease pain with amb.              min Patient Education:  YES  Reviewed HEP   []  Progressed/Changed HEP based on:   Patient reports compliance          Other Objective/Functional Measures:     Minimal cueing with therex. Incorporated sit<>stand from 17.5 in to stimulate pt's toilet seat height. Pt unable to perform w/o UE at this time. Performed box squats w/o box cueing for proper form. Pt tolerated session well   Post Treatment Pain Level (on 0 to 10) scale:   0  / 10      ASSESSMENT     Assessment/Changes in Function:      Minimal cueing with therex.  Pt tolerated progression and overall session well.   []  See Progress Note/Recertification   Patient will continue to benefit from skilled PT services to modify and progress therapeutic interventions, address functional mobility deficits, address ROM deficits, address strength deficits, analyze and address soft tissue restrictions, analyze and cue movement patterns, analyze and modify body mechanics/ergonomics and assess and modify postural abnormalities to attain remaining goals. Progress toward goals / Updated goals:     · Goals for this certification period include and are to be achieved in   4  weeks: 1.   Patient will improve B HS flexibility to -20 deg to decrease stress on spine. HS stretching  2.   Patient will be independent with HEP to self-manage and prevent symptoms upon DC. 3.   Patient will improve lumbar FOTO score to 56 points to indicate improved functional status. 4.   Patient will improve B hip abd strength to 3+/5 to improve dynamic pelvic stability in gait. LE therex (see flowsheet)  5.   Patient will demonstrate proper lifting mechanics with 30# without increased symptoms to prepare for RTW.  Box lifts added today (see above)  6.   Patient will perform 5 x sit to stand independently and equal LE WB in order to improve independence with functional mobility  7.   Patient will negotiate 10 steps with reciprocal pattern w/ LRAD and use of unilateral hand rail in order to improve safety with home/community navigation      PLAN           [x]  Upgrade activities as tolerated YES Continue plan of care   []  Discharge due to :     []  Other:              Therapist: EDER Manrique     Date: 8/21/2017 Time: 9:56 AM        Future Appointments  Date Time Provider Yocasta Loaiza   8/23/2017 8:30 AM [facility]   8/29/2017 10:00 AM [facility]   8/31/2017 10:00 AM [facility]   10/11/2017 10:00 AM Leanne Carmona MD [address] [place] [address]

## 2017-08-21 NOTE — PROGRESS NOTES
PHYSICAL THERAPY - DAILY TREATMENT NOTE    Patient Name: Andi Espinal        Date: 2017  : 1945   YES Patient  Verified  Visit #:     Insurance: Payor: Heriberto Rodriguez / Plan: [place] MEDICARE PART A & B / Product Type: Medicare /      In time: 9:34 Out time: 10:19   Total Treatment Time: 45     Medicare Time Tracking (below)   Total Timed Codes (min):  45 1:1 Treatment Time:  38     TREATMENT AREA =  Bilateral hip pain [M25.551, M25.552]  Acute postoperative pain of hip [M25.559, G89.18]  Low back pain [M54.5]    SUBJECTIVE    Pain Level (on 0 to 10 scale):  0  / 10   Medication Changes/New allergies or changes in medical history, any new surgeries or procedures? NO     If yes, update Summary List   Subjective Functional Status/Changes:  []  No changes reported     \"I am good. The steps are very hard today. \"          OBJECTIVE    45 (38) min Therapeutic Exercise:  [x]  See flow sheet   Rationale:      increase ROM, increase strength, improve balance and increase proprioception to improve the patients ability to perform ADLs with ease and decrease pain with amb.      min Patient Education:  YES  Reviewed HEP   []  Progressed/Changed HEP based on:   Patient reports compliance     Other Objective/Functional Measures:    Minimal cueing with therex. Incorporated sit<>stand from 17.5 in to stimulate pt's toilet seat height. Pt unable to perform w/o UE at this time. Performed box squats w/o box cueing for proper form. Pt tolerated session well     Post Treatment Pain Level (on 0 to 10) scale:   0  / 10     ASSESSMENT    Assessment/Changes in Function:     Minimal cueing with therex.  Pt tolerated progression and overall session well.     []  See Progress Note/Recertification   Patient will continue to benefit from skilled PT services to modify and progress therapeutic interventions, address functional mobility deficits, address ROM deficits, address strength deficits, analyze and address soft tissue restrictions, analyze and cue movement patterns, analyze and modify body mechanics/ergonomics and assess and modify postural abnormalities to attain remaining goals. Progress toward goals / Updated goals:    · Goals for this certification period include and are to be achieved in   4  weeks: 1.   Patient will improve B HS flexibility to -20 deg to decrease stress on spine. HS stretching  2.   Patient will be independent with HEP to self-manage and prevent symptoms upon DC. 3.   Patient will improve lumbar FOTO score to 56 points to indicate improved functional status. 4.   Patient will improve B hip abd strength to 3+/5 to improve dynamic pelvic stability in gait. LE therex (see flowsheet)  5.   Patient will demonstrate proper lifting mechanics with 30# without increased symptoms to prepare for RTW.  Box lifts added today (see above)  6.   Patient will perform 5 x sit to stand independently and equal LE WB in order to improve independence with functional mobility  7.   Patient will negotiate 10 steps with reciprocal pattern w/ LRAD and use of unilateral hand rail in order to improve safety with home/community navigation     PLAN    [x]  Upgrade activities as tolerated YES Continue plan of care   []  Discharge due to :    []  Other:      Therapist: EDER Collazo    Date: 8/21/2017 Time: 9:56 AM     Future Appointments  Date Time Provider Yocasta Loaiza   8/21/2017 10:00 AM [facility]   8/23/2017 8:30 AM [facility]   8/29/2017 10:00 AM [facility]   8/31/2017 10:00 AM [facility]   10/11/2017 10:00 AM López Galindo MD [address] [facility]

## 2017-08-23 ENCOUNTER — APPOINTMENT (OUTPATIENT)
Dept: PHYSICAL THERAPY | Age: 72
End: 2017-08-23
Payer: COMMERCIAL

## 2017-08-24 ENCOUNTER — HOSPITAL ENCOUNTER (OUTPATIENT)
Dept: PHYSICAL THERAPY | Age: 72
Discharge: HOME | End: 2017-08-24
Payer: COMMERCIAL

## 2017-08-24 PROCEDURE — 97110 THERAPEUTIC EXERCISES: CPT

## 2017-08-24 NOTE — PROGRESS NOTES
PHYSICAL THERAPY - DAILY TREATMENT NOTE      Patient Name: Makenzie Oliva        Date: 2017  : 1945   YES Patient  Verified  Visit #:     Insurance: Payor: Chip Morales / Plan: [place] MEDICARE PART A & B / Product Type: Medicare /       In time: 3:40 Out time: 4:35   Total Treatment Time: 55 min     Medicare Time Tracking (below)   Total Timed Codes (min):  55 1:1 Treatment Time:  55     TREATMENT AREA =  Bilateral hip pain [M25.551, M25.552]  Acute postoperative pain of hip [M25.559, G89.18]  Low back pain [M54.5]     SUBJECTIVE    Pain Level (on 0 to 10 scale):  0  / 10   Medication Changes/New allergies or changes in medical history, any new surgeries or procedures? NO    If yes, update Summary List   Subjective Functional Status/Changes:  []  No changes reported     \"I'm walking up to 25 minutes on the treadmill at 2.6 mph. \"        OBJECTIVE    55 (55) min Therapeutic Exercise:  [x]  See flow sheet   Rationale:      increase ROM, increase strength, improve coordination, improve balance and increase proprioception to improve the patients ability to perform ADL's and functional mobility with increased ease and efficiency of movement      1 min Patient Education:  YES  Reviewed HEP   []  Progressed/Changed HEP based on: Other Objective/Functional Measures:    Decreasing excursion with inc reps of bridges  Min VC required for proper therex performance     Post Treatment Pain Level (on 0 to 10) scale:   0  / 10     ASSESSMENT    Assessment/Changes in Function:     Pt progressing well with therapeutic exercises. Pt is out of town x 1 week and will schedule 1x/week for September.      []  See Progress Note/Recertification   Patient will continue to benefit from skilled PT services to modify and progress therapeutic interventions, address functional mobility deficits, address ROM deficits, address strength deficits, analyze and address soft tissue restrictions, analyze and cue movement patterns, analyze and modify body mechanics/ergonomics, assess and modify postural abnormalities, address imbalance/dizziness and instruct in home and community integration to attain remaining goals. Progress toward goals / Updated goals: 1.   Patient will improve B HS flexibility to -20 deg to decrease stress on spine. HS stretching  2.   Patient will be independent with HEP to self-manage and prevent symptoms upon DC. 3.   Patient will improve lumbar FOTO score to 56 points to indicate improved functional status. 4.   Patient will improve B hip abd strength to 3+/5 to improve dynamic pelvic stability in gait. LE therex (see flowsheet)  5.   Patient will demonstrate proper lifting mechanics with 30# without increased symptoms to prepare for RTW.  Box lifts added today (see above)  6.   Patient will perform 5 x sit to stand independently and equal LE WB in order to improve independence with functional mobility  7.   Patient will negotiate 10 steps with reciprocal pattern w/ LRAD and use of unilateral hand rail in order to improve safety with home/community navigation     PLAN    [x]  Upgrade activities as tolerated YES Continue plan of care   []  Discharge due to :    []  Other:      Therapist: Karie Dickerson DPT    Date: 8/24/2017 Time: 10:30 AM     Future Appointments  Date Time Provider Yocasta Loaiza   8/24/2017 4:00 PM [facility]   8/29/2017 10:00 AM [facility]   8/31/2017 10:00 AM [facility]   10/11/2017 10:00 AM Lobito Ochoa MD [address]

## 2017-08-29 ENCOUNTER — APPOINTMENT (OUTPATIENT)
Dept: PHYSICAL THERAPY | Age: 72
End: 2017-08-29
Payer: COMMERCIAL

## 2017-08-31 ENCOUNTER — APPOINTMENT (OUTPATIENT)
Dept: PHYSICAL THERAPY | Age: 72
End: 2017-08-31
Payer: COMMERCIAL

## 2017-09-12 ENCOUNTER — HOSPITAL ENCOUNTER (OUTPATIENT)
Dept: PHYSICAL THERAPY | Age: 72
Discharge: HOME | End: 2017-09-12
Payer: COMMERCIAL

## 2017-09-12 PROCEDURE — G8980 MOBILITY D/C STATUS: HCPCS

## 2017-09-12 PROCEDURE — 97530 THERAPEUTIC ACTIVITIES: CPT

## 2017-09-12 PROCEDURE — G8979 MOBILITY GOAL STATUS: HCPCS

## 2017-09-12 PROCEDURE — 97110 THERAPEUTIC EXERCISES: CPT

## 2017-09-12 NOTE — PROGRESS NOTES
PHYSICAL THERAPY - DAILY TREATMENT NOTE      Patient Name: Ruba Fernandez        Date: 2017  : 1945   YES Patient  Verified  Visit #:     Insurance: Payor: [place] MEDICARE / Plan: [place] MEDICARE PART A & B / Product Type: Medicare /      In time: 2:27 Out time: 3:15   Total Treatment Time: 48     Medicare Time Tracking (below)   Total Timed Codes (min):  48 1:1 Treatment Time:  40     TREATMENT AREA =  Bilateral hip pain [M25.551, M25.552]  Acute postoperative pain of hip [M25.559, G89.18]  Low back pain [M54.5]    SUBJECTIVE    Pain Level (on 0 to 10 scale):  0  / 10   Medication Changes/New allergies or changes in medical history, any new surgeries or procedures? NO    If yes, update Summary List   Subjective Functional Status/Changes:  []  No changes reported     \"I'm doing OK. \"        OBJECTIVE    38 (30) min Therapeutic Exercise:  [x]  See flow sheet   Rationale:      increase ROM, increase strength, improve coordination, improve balance and increase proprioception to improve the patients ability to perform ADL's and functional mobility with increased ease and efficiency of movement      10 (10) min Therapeutic Activity: FOTO, Re-assessment   Rationale: To assess current functional limitations and review POC    1 min Patient Education:  YES  Reviewed HEP   []  Progressed/Changed HEP based on: Other Objective/Functional Measures:    See D/C note     Post Treatment Pain Level (on 0 to 10) scale:   0  / 10     ASSESSMENT    Assessment/Changes in Function:     See D/C note     []  See Progress Note/Recertification        Progress toward goals / Updated goals:    · Goals for this certification period include and are to be achieved in   4  weeks:  1. Patient will improve B HS flexibility to -20 deg to decrease stress on spine. MET  2. Patient will be independent with HEP to self-manage and prevent symptoms upon DC. MET  3.    Patient will improve lumbar FOTO score to 56 points to indicate improved functional status. MET  4. Patient will improve B hip abd strength to 3+/5 to improve dynamic pelvic stability in gait. MET  5. Patient will demonstrate proper lifting mechanics with 30# without increased symptoms to prepare for RTW. Not Met  6. Patient will perform 5 x sit to stand independently and equal LE WB in order to improve independence with functional mobility. MET  7. Patient will negotiate 10 steps with reciprocal pattern w/ LRAD and use of unilateral hand rail in order to improve safety with home/community navigation.  MET       PLAN    [x]  Upgrade activities as tolerated YES Continue plan of care   []  Discharge due to :    []  Other:      Therapist: Jarett Jackson DPT    Date: 9/12/2017 Time: 12:22 PM     Future Appointments  Date Time Provider Yocasta Loaiza   9/12/2017 2:30 PM [facility]   10/11/2017 10:00 AM Marie Redmond MD [address] [facility]

## 2017-09-18 NOTE — PROGRESS NOTES
[address] [facility] PHYSICAL THERAPY  [address] [facility], [address] - Phone: [phone]  Fax: [phone] SUMMARY  Patient Name: Cheyanne Alegria : 1945   Treatment/Medical Diagnosis: Bilateral hip pain [M25.551, M25.552]  Acute postoperative pain of hip [M25.559, G89.18]  Low back pain [M54.5]   Referral Source: Ted Wills MD     Date of Initial Visit: 17 Attended Visits: 25 Missed Visits: 2     SUMMARY OF TREATMENT  Patient's POC has consisted of therex, therapeutic activities, manual therapy prn, modalities prn, pt. education, and a comprehensive HEP. Treatment strategies used to address functional mobility deficits, ROM deficits, strength deficits, analyze and address soft tissue restrictions, analyze and cue movement patterns, analyze and modify body mechanics/ergonomics, assess and modify postural abnormalities and instruct in home and community integration. CURRENT STATUS  Patient has demonstrated excellent progress since initiation of POC on 17. Patient is ambulating community distances independently without pain at this time. Pt's FOTO functional score has improved from 33% at IE to 59% today indicating significant improvement in functional mobility. Pt does continue to report intermittent R sided LBP/SIJ pain however this poses few limitations in her daily life. Patient will be D/C at this time with comprehensive HEP.     Goal/Measure of Progress Goal Met?    1.   Patient will improve B HS flexibility to -20 deg to decrease stress on spine. 2.   Patient will be independent with HEP to self-manage and prevent symptoms upon DC. 3.   Patient will improve lumbar FOTO score to 56 points to indicate improved functional status. 4.   Patient will improve B hip abd strength to 3+/5 to improve dynamic pelvic stability in gait.   5.   Patient will demonstrate proper lifting mechanics with 30# without increased symptoms to prepare for RTW. Not Met  6.   Patient will perform 5 x sit to stand independently and equal LE WB in order to improve independence with functional mobility. 7.   Patient will negotiate 10 steps with reciprocal pattern w/ LRAD and use of unilateral hand rail in order to improve safety with home/community navigation. 1. MET    2. MET    3. MET    4. MET    5. Not met    6. MET    7. MET        G-Codes: Mobility   Goal  CK= 40-59%  D/C  CK= 40-59%. The severity rating is based on the FOTO Score     RECOMMENDATIONS  Discontinue therapy. Progressing towards or have reached established goals. If you have any questions/comments please contact us directly at [phone]. Thank you for allowing us to assist in the care of your patient. Therapist Signature: Pamela Pennington DPT Date: 9/18/17   Reporting Period: 8/3/17 - 9/12/17 Time: 9:44 AM     ===========================================================================================  I certify that the above Physical Therapy Services are being furnished while the patient is under my care. I agree with the treatment plan and certify that this therapy is necessary. Physician Signature:        Date:       Time:     Please sign and return to [facility] or you may fax the signed copy to [phone]. Thank you.

## 2018-11-29 PROBLEM — N39.0 RECURRENT UTI: Status: ACTIVE | Noted: 2018-11-29

## 2018-11-29 PROBLEM — N39.41 URGE URINARY INCONTINENCE: Status: ACTIVE | Noted: 2018-11-29

## 2019-11-20 ENCOUNTER — APPOINTMENT (OUTPATIENT)
Dept: PHYSICAL THERAPY | Age: 74
End: 2019-11-20
Payer: MEDICARE

## 2019-12-02 ENCOUNTER — HOSPITAL ENCOUNTER (OUTPATIENT)
Dept: PHYSICAL THERAPY | Age: 74
Discharge: HOME | End: 2019-12-02
Payer: MEDICARE

## 2019-12-02 PROCEDURE — 97162 PT EVAL MOD COMPLEX 30 MIN: CPT

## 2019-12-02 PROCEDURE — 97112 NEUROMUSCULAR REEDUCATION: CPT

## 2019-12-02 NOTE — PROGRESS NOTES
PHYSICAL THERAPY - DAILY TREATMENT NOTE    Patient Name: Rosi Garcia        Date: 2019  : 1945   YES Patient  Verified  Visit #:   1     Insurance: Payor: Wing May / Plan: [place] MEDICARE PART A & B / Product Type: Medicare /      In time: 9:55 Out time: 10:47   Total Treatment Time: 52     Medicare/BCBS [place] Time Tracking (below)   Total Timed Codes (min):  8 1:1 Treatment Time:  8     TREATMENT AREA =  Vestibular dysfunction    SUBJECTIVE    Pain Level (on 0 to 10 scale):  0  / 10, mild dizziness   Medication Changes/New allergies or changes in medical history, any new surgeries or procedures? NO    If yes, update Summary List   Subjective Functional Status/Changes:  []  No changes reported     Pt reports that she has vestibular positional dizziness. Pt reports that initial attack occurred 2 years ago. Pt reports that she underwent Epley maneuver and it got a little better. Pt reports that now it is worse. Pt reports that she did not have an attack. Pt reports that she has spinning when she lies down in bed. Pt reports that spinning is worse when she rolls onto her right side, slightly better when she rolls onto her left side. Pt denies pain, denies numbness/tingling. Pt reports generalized weakness.        OBJECTIVE    Physical Therapy Evaluation - Vestibular    Posture:  [] WNL      [x] Forward head    [x] Protracted shoulders    [] Retracted shoulders  [] Kyphosis:  [x] increased   [] decreased   [] Lordosis:   [] increased   [x] decreased  Other:    C/S ROM: [] WFL    [x] Limited    Describe: Decreased ROM, asymptomatic    Strength: [] WFL    [x] Limited    Describe: Grossly 4/5 B UE/LE    Optional Tests:  Sensation:  [x] Intact [] Diminished    Describe:    Proprioception: [x] Intact [] Diminished    Describe:    Coordination Testing:       Disdiadochokinesia [x] WFL    [] Impaired    Describe:       Heel - Shin  [x] [facility]    [] Impaired    Describe:       FNF   [x] [facility] [] Impaired    Describe: Toe Tap   [x] [facility]    [] Impaired    Describe:    Oculomotor Tests: (Fixation Not Blocked)       Ocular ROM:   [x] WFL    [] Limited    Describe:       Spontaneous Nystag. [x] Neg     [] Pos    [] Left    [] Right       Gaze Holding Nystag. [x] Neg     [] Pos    [] Left    [] Right        Smooth Pursuit  [] Neg     [x] Pos    [] Left    [] Right + dizziness       Saccades   [] Neg     [x] Pos    [] Left    [] Right + dizziness       VOR - Slow Head Mvmt [] Neg     [x] Pos    [] Left    [] Right + dizziness       VOR - Fast Head Mvmt [] Neg     [] Pos    [] Left    [] Right        Head Thrust  [] Neg     [] Pos    [] Left    [] Right        Static Visual Acuity [] Neg     [] Pos    [] Left    [] Right        Dynamic Visual Acuity [] Neg     [] Pos    [] Left    [] Right     Other Special Tests:       Vertebral Artery Testing [x] Neg     [] Pos    [] Left    [] Right       Hallpike-Rony Maneuver [x] Neg     [] Pos    [] Left    [] Right + dizziness B       Roll Test   [x] Neg     [] Pos    [] Left    [] Right + dizziness B       [place] Balance Scale [] Neg     [] Pos    Score:       Dynamic Gait Index [] Neg     [] Pos    Score:       Functional Gait Assess. [] Neg     [x] Pos    Score: 18/30    Balance Standard Testing (Eyes Open/Eyes Closed - EO/EC)       Romberg   [x] WFL    [] Pos    Describe: 30\"/30\"         Stand on Foam  [] WFL    [x] Pos    Describe: 30\"/6\"       Standing on Rail  [] WFL    [x] Pos    Describe: 30\"/5\"        Sharpened Romberg [] WFL    [x] Pos    Describe: Unable       Single Leg Stand  [] WFL    [x] Pos    Describe: 2\"B     Motion Sensitivity:  [] Neg     [] Pos    Describe:    Computerized Dynamic Posturography:        [] Not Tested    [] WFL    Score:     Other Tests:    8 min Neuromuscular Re-ed: See flowsheet   Rationale: improve balance to improve the patient's ability to perform ADLs/IADLs, functional mobility and gait safely and independently without increased pain/symptoms      During NM min Patient Education:  YES  Reviewed HEP   []  Progressed/Changed HEP based on:   Initiated HEP (copy in chart)     Other Objective/Functional Measures:    See eval  Initiated VSE and EO/EC balance per flowsheet     Post Treatment Pain Level (on 0 to 10) scale:   0  / 10     ASSESSMENT    Assessment/Changes in Function:     See plan of care    Justification for Eval Code Complexity:  Patient History : HIGH - prior h/o vertigo, arthritis, right THR, B hand tremors  Examination HIGH - See objective  Clinical Presentation: MEDIUM  Clinical Decision Making : MEDIUM - FOTO 56/100     []  See Progress Note/Recertification   Patient will continue to benefit from skilled PT services: see plan of care   Progress toward goals / Updated goals:    See plan of care       PLAN    [x]  Upgrade activities as tolerated YES Continue plan of care   []  Discharge due to :    []  Other:      Therapist: Octavio Vidal PT    Date: 12/2/2019 Time: 9:57 AM

## 2019-12-02 NOTE — PROGRESS NOTES
[address] [facility] PHYSICAL THERAPY  [address] [facility] [address], [address] - Phone: [phone]  Fax: [phone] / [address] [facility]  Patient Name: Jordy Vasquez : 1945   Medical   Diagnosis: Gait instability [R26.81] Treatment Diagnosis: Gait instability [R26.81]   Onset Date: 2 years ago with recent exacerbation     Referral Source: Claudeen Holms, MD [facility]): 2019   Prior Hospitalization: See medical history Provider #: [id number]   Prior Level of Function: Independent with ADLs, ambulation   Comorbidities: Prior h/o vertigo, arthritis, right THR, B hand tremors   Medications: Verified on Patient Summary List   The Plan of Care and following information is based on the information from the initial evaluation.   ===========================================================================================  Assessment / key information:  Patient is a 69 y.o. female who presents with complaints of dizziness/vertigo, which have become worse recently. Patient demonstrates impaired posture, decreased C/S ROM, dizziness with smooth pursuit/saccades/VOR, dizziness without nystagmus with Roll test/Hallpike-Rony test, decreased static standing balance (foam stance = 30\" EO/6\" EC, rail stance = 30\" EO/5\" EC, sharpened Romberg = unable, single leg stance = 2\"B) and decreased dynamic standing balance/impaired gait (Functional Gait Assessment = , increased risk of fall). .  Patient would benefit from skilled PT services to address these issues and improve function.   Thank you for this referral.    FOTO: 56/100 (moderate severity)  ==========================================================================================  Eval Complexity: History: HIGH Complexity :3+ comorbidities / personal factors will impact the outcome/ POC Exam:HIGH Complexity : 4+ Standardized tests and measures addressing body structure, function, activity limitation and / or participation in recreation  Presentation: MEDIUM Complexity : Evolving with changing characteristics  Clinical Decision Making:MEDIUM Complexity : FOTO score of 26-74Overall Complexity:MEDIUM    Problem List: pain affecting function, decrease ROM, decrease strength, impaired gait/ balance, decrease ADL/ functional abilitiies, decrease activity tolerance, decrease flexibility/ joint mobility and decrease transfer abilities   Treatment Plan may include any combination of the following: Therapeutic exercise, Therapeutic activities, Neuromuscular re-education, Physical agent/modality, Gait/balance training, Manual therapy, Patient education, Functional mobility training, Home safety training and Stair training  Patient / Family readiness to learn indicated by: asking questions, trying to perform skills and interest  Persons(s) to be included in education: patient (P)  Barriers to Learning/Limitations: None  Measures taken:    Patient Goal (s): \"Less dizziness. \"   Patient self reported health status: fair  Rehabilitation Potential: good   Short Term Goals: To be accomplished in  2-4  weeks:  1. Patient will demonstrate compliance with HEP. 2. Patient will maintain foam stance eyes closed 30\" to increase safety in challenging environments. 3. Patient will score greater than or equal to 20/30 on FGA to indicate improved gait/decreased fall risk.  Long Term Goals: To be accomplished in  4-8  weeks:  1. Patient will demonstrate independence with HEP. 2. Patient will maintain single leg stance 5\"B to increase safety with curb/stair negotiation and decrease fall risk. 3. Patient will score greater than or equal to 22/30 on FGA to indicate improved gait/decreased fall risk.   Frequency / Duration:   Patient to be seen  2  times per week for 4-8  weeks:  Patient / Caregiver education and instruction: exercises    Therapist Signature: Tita Holman, PT Date: 12/2/2019 Certification Period: 12/2/2019-3/1/2020 Time: 9:57 AM   ===========================================================================================  I certify that the above Physical Therapy Services are being furnished while the patient is under my care. I agree with the treatment plan and certify that this therapy is necessary. Physician Signature:        Date:       Time:     Please sign and return to [facility] or you may fax the signed copy to [phone]. Thank you.

## 2019-12-05 ENCOUNTER — HOSPITAL ENCOUNTER (OUTPATIENT)
Dept: PHYSICAL THERAPY | Age: 74
Discharge: HOME | End: 2019-12-05
Payer: MEDICARE

## 2019-12-05 PROCEDURE — 97110 THERAPEUTIC EXERCISES: CPT

## 2019-12-05 NOTE — PROGRESS NOTES
PHYSICAL THERAPY - DAILY TREATMENT NOTE    Patient Name: Lacy Underwood        Date: 2019  : 1945   YES Patient  Verified  Visit #:   2     Insurance: Payor: Maia Campbell / Plan: [place] MEDICARE PART A & B / Product Type: Medicare /      In time: 10:21 Out time: 10:52   Total Treatment Time: 31     Medicare/BCBS [place] Time Tracking (below)   Total Timed Codes (min):  31 1:1 Treatment Time:  31     TREATMENT AREA =  Vestibular dysfunction    SUBJECTIVE    Pain Level (on 0 to 10 scale):  0  / 10   Medication Changes/New allergies or changes in medical history, any new surgeries or procedures?    no    If yes, update Summary List   Subjective Functional Status/Changes:  []  No changes reported     Patient denies any pain but states she did not sleep well last night. Patient reports compliance with HEP. Reports she would like to review the VSE. Reports the exercises are \"overwhelming. \"  Patient later reports she meant the exercises were confusing. States Margarita Elmore are crystal clear now that you've explained them. \"         OBJECTIVE    31 min Therapeutic Exercise:  [x]  See flow sheet   Rationale:      increase ROM, increase strength, improve coordination, improve balance and increase proprioception to improve the patients ability to perform ADL's, gait, and functional mobility with increased safety. min Patient Education:  YES  Reviewed HEP   []  Progressed/Changed HEP based on:   Reviewed VSE seated; cont current HEP     Other Objective/Functional Measures: Added several LE strengthening exercises per flowsheet. Patient only able to complete 8 reps of S/L clams. Performed minisquats without UE support.      Post Treatment Pain Level (on 0 to 10) scale:   0  / 10     ASSESSMENT    Assessment/Changes in Function:     Patient reported muscle soreness with S/L clam.     []  See Progress Note/Recertification   Patient will continue to benefit from skilled PT services to modify and progress therapeutic interventions, address functional mobility deficits, address ROM deficits, address strength deficits, analyze and address soft tissue restrictions, analyze and cue movement patterns, analyze and modify body mechanics/ergonomics, assess and modify postural abnormalities, address imbalance/dizziness and instruct in home and community integration to attain remaining goals. Progress toward goals / Updated goals: · Short Term Goals: To be accomplished in  2-4  weeks:  1. Patient will demonstrate compliance with HEP. Reports compliance with HEP  2. Patient will maintain foam stance eyes closed 30\" to increase safety in challenging environments. 3. Patient will score greater than or equal to 20/30 on FGA to indicate improved gait/decreased fall risk. Began LE strengthening exercises.      PLAN    [x]  Upgrade activities as tolerated YES Continue plan of care   []  Discharge due to :    []  Other:      Therapist: Eva Nielson PT    Date: 12/5/2019 Time: 10:25 AM     Future Appointments   Date Time Provider Yocasta Loaiza   12/5/2019 10:30 AM Sita Magana [facility]   12/10/2019 11:00 AM Rex Dejesus [facility]   12/12/2019 10:00 AM Rex Dejesus [facility]   12/17/2019  1:00 PM Rex Dejesus [facility]   12/19/2019 11:00 AM Sita Magana [facility]   12/31/2019 10:00 AM Sita Magana [facility]

## 2019-12-10 ENCOUNTER — APPOINTMENT (OUTPATIENT)
Dept: PHYSICAL THERAPY | Age: 74
End: 2019-12-10
Payer: MEDICARE

## 2019-12-12 ENCOUNTER — HOSPITAL ENCOUNTER (OUTPATIENT)
Dept: PHYSICAL THERAPY | Age: 74
Discharge: HOME | End: 2019-12-12
Payer: MEDICARE

## 2019-12-12 PROCEDURE — 97112 NEUROMUSCULAR REEDUCATION: CPT

## 2019-12-12 NOTE — PROGRESS NOTES
PHYSICAL THERAPY - DAILY TREATMENT NOTE    Patient Name: Carolyn Tyler        Date: 2019  : 1945   YES Patient  Verified  Visit #:   3     Insurance: Payor: Pablo Walters / Plan: [place] MEDICARE PART A & B / Product Type: Medicare /      In time: 11:04 Out time: 11:32   Total Treatment Time: 28     Medicare/BCBS [place] Time Tracking (below)   Total Timed Codes (min):  28 1:1 Treatment Time:  28     TREATMENT AREA =  Vestibular dysfunction    SUBJECTIVE    Pain Level (on 0 to 10 scale):  0  / 10   Medication Changes/New allergies or changes in medical history, any new surgeries or procedures?    no    If yes, update Summary List   Subjective Functional Status/Changes:  []  No changes reported     \"I haven't had time to do the exercises. My  has had the flu and it has been horrible. He is a heart transplant patient and I have been very worried about him. \"  \"I have been doing the card exercises though. \"         OBJECTIVE    28 min Neuromuscular Re-ed:    Rationale:      increase ROM, increase strength, improve coordination, improve balance and increase proprioception to improve the patients ability to perform ADL's, gait, and functional mobility with increased safety and independence. min Patient Education:  YES  Reviewed HEP   []  Progressed/Changed HEP based on:   Progressed VSE; unable to progress EO/EC balance exercises     Other Objective/Functional Measures:    VSE standing    EO MSR(GT) 30\" B (+ sway)  EC ROM: 27\"    EO MSR(instep) on AirEx: 30\" B  EC stance on AirEx: 30\"    Dynamic gait exercises as follows: 1) gait with horizontal and vertical head turns X 70' each, 2) backward gait X 30', 3) gait with eyes closed X 10', 4) marching X 20'. Post Treatment Pain Level (on 0 to 10) scale:   0  / 10     ASSESSMENT    Assessment/Changes in Function:     Unable to progress EO/EC balance exercises today.   Required supervision with all dynamic gait exercises except gait with eyes closed which required CGA. []  See Progress Note/Recertification   Patient will continue to benefit from skilled PT services to modify and progress therapeutic interventions, address functional mobility deficits, address ROM deficits, address strength deficits, analyze and address soft tissue restrictions, analyze and cue movement patterns, analyze and modify body mechanics/ergonomics, assess and modify postural abnormalities, address imbalance/dizziness and instruct in home and community integration to attain remaining goals. Progress toward goals / Updated goals: · Short Term Goals: To be accomplished in  2-4  weeks:  1. Patient will demonstrate compliance with HEP. Partial compliance with HEP. 2. Patient will maintain foam stance eyes closed 30\" to increase safety in challenging environments. EC stance on AirEx: 30\"  3. Patient will score greater than or equal to 20/30 on FGA to indicate improved gait/decreased fall risk. performed dynamic gait exercises per flowsheet.      PLAN    [x]  Upgrade activities as tolerated YES Continue plan of care   []  Discharge due to :    []  Other:      Therapist: Triny Garcia PT    Date: 12/12/2019 Time: 7:34 AM     Future Appointments   Date Time Provider Yocasta Loaiza   12/12/2019 11:00 AM Kadie Valdes PT [facility]   12/17/2019  8:30 AM Kadie Valdes PT [facility]   12/19/2019 11:00 AM Kadie Valdes PT [facility]   12/31/2019 10:00 AM Kadie Valdes PT [facility]

## 2019-12-17 ENCOUNTER — APPOINTMENT (OUTPATIENT)
Dept: PHYSICAL THERAPY | Age: 74
End: 2019-12-17
Payer: MEDICARE

## 2019-12-19 ENCOUNTER — HOSPITAL ENCOUNTER (OUTPATIENT)
Dept: PHYSICAL THERAPY | Age: 74
Discharge: HOME | End: 2019-12-19
Payer: MEDICARE

## 2019-12-19 PROCEDURE — 97110 THERAPEUTIC EXERCISES: CPT

## 2019-12-19 NOTE — PROGRESS NOTES
PHYSICAL THERAPY - DAILY TREATMENT NOTE    Patient Name: Gordon Garcia        Date: 2019  : 1945   YES Patient  Verified  Visit #:     Insurance: Payor: Maricruz Cones / Plan: [place] MEDICARE PART A & B / Product Type: Medicare /      In time: 10:38 Out time: 11:13   Total Treatment Time: 35     Medicare/BCBS [place] Time Tracking (below)   Total Timed Codes (min):  35 1:1 Treatment Time:  35     TREATMENT AREA =  Vestibular dysfunction    SUBJECTIVE    Pain Level (on 0 to 10 scale):  0  / 10   Medication Changes/New allergies or changes in medical history, any new surgeries or procedures?    no    If yes, update Summary List   Subjective Functional Status/Changes:  []  No changes reported     \"My  still has the flu so I haven't been able to do many exercises. I do the card exercises. \"         OBJECTIVE      35 min Therapeutic Exercise:  [x]  See flow sheet   Rationale:      increase ROM, increase strength, improve coordination, improve balance and increase proprioception to improve the patients ability to perform ADL's, gait, and functional mobility with increased safety and independence. min Patient Education:  YES  Reviewed HEP   []  Progressed/Changed HEP based on:   Cont doing HEP as able     Other Objective/Functional Measures:    Increased reps with standing hip abd/ext, bridge, and side lying clam.  Did not add any exercises due to patient's reports of fatigue. Post Treatment Pain Level (on 0 to 10) scale:   0  / 10     ASSESSMENT    Assessment/Changes in Function:     Patient fatigued with exercises.      []  See Progress Note/Recertification   Patient will continue to benefit from skilled PT services to modify and progress therapeutic interventions, address functional mobility deficits, address ROM deficits, address strength deficits, analyze and address soft tissue restrictions, analyze and cue movement patterns, analyze and modify body mechanics/ergonomics, assess and modify postural abnormalities, address imbalance/dizziness and instruct in home and community integration to attain remaining goals. Progress toward goals / Updated goals: · Short Term Goals: To be accomplished in  2-4  weeks:  1. Patient will demonstrate compliance with HEP. Partial compliance with HEP. 2. Patient will maintain foam stance eyes closed 30\" to increase safety in challenging environments. 3. Patient will score greater than or equal to 20/30 on FGA to indicate improved gait/decreased fall risk.  Cont LE strengthening exercises.      PLAN    [x]  Upgrade activities as tolerated YES Continue plan of care   []  Discharge due to :    []  Other:      Therapist: Lauren Almaraz PT    Date: 12/19/2019 Time: 10:41 AM     Future Appointments   Date Time Provider Yocasta Loaiza   12/19/2019 11:00 AM Barbara Albert PT [facility]   12/31/2019 10:00 AM Barbara Albert PT [facility]

## 2019-12-31 ENCOUNTER — HOSPITAL ENCOUNTER (OUTPATIENT)
Dept: PHYSICAL THERAPY | Age: 74
Discharge: HOME | End: 2019-12-31
Payer: MEDICARE

## 2019-12-31 PROCEDURE — 97112 NEUROMUSCULAR REEDUCATION: CPT

## 2019-12-31 NOTE — PROGRESS NOTES
PHYSICAL THERAPY - DAILY TREATMENT NOTE    Patient Name: Fotser Calderon        Date: 2019  : 1945   YES Patient  Verified  Visit #:   5     Insurance: Payor: Lucia Mercer / Plan: [place] MEDICARE PART A & B / Product Type: Medicare /      In time: 9:57 Out time: 10:28   Total Treatment Time: 31     Medicare/BCBS [place] Time Tracking (below)   Total Timed Codes (min):  31 1:1 Treatment Time:  31     TREATMENT AREA =  Vestibular dysfunction    SUBJECTIVE    Pain Level (on 0 to 10 scale):  0  / 10   Medication Changes/New allergies or changes in medical history, any new surgeries or procedures?    no    If yes, update Summary List   Subjective Functional Status/Changes:  []  No changes reported     \"Walking is getting easier. I can't pinpoint anything but I am not a afraid when I am walking now. \"          OBJECTIVE    31 min Neuromuscular Re-ed:    Rationale:      improve coordination, improve balance and increase proprioception to improve the patients ability to perform ADL's, gait, and functional mobility with increased safety.        min Patient Education:  YES  Reviewed HEP   []  Progressed/Changed HEP based on:   Progressed HEP per handout     Other Objective/Functional Measures:    EC stance on AirEx: 30\"   FGA:   B LE strength: generally 4/5 throughout  10 Meter Walk Test = 10\" which correlates to 1.0 meters/second and indicates he is a community ambulator    EO MSR(GT): 30\" B  EC MSR(instep): 30\" B (+ sway)    EO MSR(bun) on AirEx: 30\" B  EC ROM on AirEx: 5\"; EC stance on AirEx: 30\"     Post Treatment Pain Level (on 0 to 10) scale:   0  / 10     ASSESSMENT    Assessment/Changes in Function:     See PN to MD     []  See Progress Note/Recertification   Patient will continue to benefit from skilled PT services to modify and progress therapeutic interventions, address functional mobility deficits, address ROM deficits, address strength deficits, analyze and address soft tissue restrictions, analyze and cue movement patterns, analyze and modify body mechanics/ergonomics, assess and modify postural abnormalities, address imbalance/dizziness and instruct in home and community integration to attain remaining goals.      Progress toward goals / Updated goals:    See PN to MD     PLAN    [x]  Upgrade activities as tolerated YES Continue plan of care   []  Discharge due to :    []  Other:      Therapist: Valiant Sicard, PT    Date: 12/31/2019 Time: 7:54 AM     Future Appointments   Date Time Provider Yocasta Loaiza   12/31/2019 10:00 AM Derek Aguilar PT [facility]   1/3/2020 11:00 AM Derek Aguilar PT [facility]   1/7/2020 10:30 AM Derek Aguilar PT [facility]   1/9/2020 11:00 AM Derek Aguilar PT [facility]   1/14/2020 11:00 AM Derek Aguilar PT [facility]   1/16/2020 11:00 AM Derek Aguilar PT [facility]   1/21/2020 11:00 AM Derek Aguilar PT [facility]   1/23/2020 11:00 AM Derek Aguilar PT [facility]   1/28/2020 11:00 AM Derek Aguilar PT [facility]   1/30/2020 10:30 AM Derek Aguilar PT [facility]

## 2019-12-31 NOTE — PROGRESS NOTES
[address]  [facility] PHYSICAL THERAPY  [address] [facility] [address], [address] - Phone: [phone]  Fax: [phone] [facility] FOR PHYSICAL THERAPY          Patient Name: Jorje Boone : 1945   Treatment/Medical Diagnosis: Gait instability [R26.81]   Onset Date: 2 years ago with recent exacerbation    Referral Source: Jack Tavares MD [facility]): 2019   Prior Hospitalization: See Medical History Provider #: [id number]   Prior Level of Function: Independent with ADLs, ambulation   Comorbidities: Prior h/o vertigo, arthritis, right THR, B hand tremors   Medications: Verified on Patient Summary List   Visits from [facility]: 5 Missed Visits: 1     Goal/Measure of Progress Goal Met? 1. Patient will demonstrate compliance with HEP. Status at last Eval: NA Current Status: Reports partial compliance with HEP due to 's illness Partially met   2. Patient will maintain foam stance eyes closed 30\" to increase safety in challenging environments. Status at last Eval: 6\" Current Status: 30\" yes   3. Patient will score greater than or equal to 20/30 on FGA to indicate improved gait/decreased fall risk. Status at last Eval:  Current Status: 2130 yes     Key Functional Changes/Progress: Patient reports partial compliance with HEP due to 's illness. Despite this, patient has progressed with physical therapy. She reports decreased fear of falling during ADL's, and IADL's now. Her Functional Gait Assessment score improved by 3 points since eval, indicating increased safety with gait. However, her FGA score of 21/30 still places her at an increased fall risk. B LE strength is generally 4/5. We have been able to progress eyes open static standing balance exercises. Eyes closed balance exercises are progressing more slowly.     Problem List: decrease strength, impaired gait/ balance, decrease ADL/ functional abilitiies, decrease activity tolerance, decrease flexibility/ joint mobility and decrease transfer abilities   Treatment Plan may include any combination of the following: Therapeutic exercise, Therapeutic activities, Neuromuscular re-education, Physical agent/modality, Gait/balance training, Manual therapy and Patient education  Patient Goal(s) has been updated and includes:      Goals for this certification period include and are to be achieved in   4  weeks:  · Long Term Goals: To be accomplished in  4-8  weeks:  1. Patient will demonstrate independence with HEP. 2. Patient will maintain single leg stance 5\"B to increase safety with curb/stair negotiation and decrease fall risk. 3. Patient will score greater than or equal to 22/30 on FGA to indicate improved gait/decreased fall risk. Frequency / Duration:   Patient to be seen   2   times per week for   4    weeks:    Assessments/Recommendations: Continue skilled PT services at above frequency to further improve B LE strength and static/dynamic standing balance to increase safety and independence with ADL's, gait, and functional mobility. If you have any questions/comments please contact us directly at [phone]. Thank you for allowing us to assist in the care of your patient. Therapist Signature: Florence Martinez PT Date: 64/20/8845   Certification Period:  Reporting Period: 12/2/2019-3/1/2020  12/2/2019 - 12/31/2019 Time: 10:35 AM   NOTE TO PHYSICIAN:  PLEASE COMPLETE THE ORDERS BELOW AND FAX TO   [facility]: [phone]   If you are unable to process this request in 24 hours please contact our office: [phone].    ___ I have read the above report and request that my patient continue as recommended.   ___ I have read the above report and request that my patient continue therapy with the following changes/special instructions:_________________________________________________________   ___ I have read the above report and request that my patient be discharged from therapy.      Physician Signature:        Date:       Time:

## 2020-01-03 ENCOUNTER — HOSPITAL ENCOUNTER (OUTPATIENT)
Dept: PHYSICAL THERAPY | Age: 75
Discharge: HOME | End: 2020-01-03
Payer: MEDICARE

## 2020-01-03 PROCEDURE — 97110 THERAPEUTIC EXERCISES: CPT

## 2020-01-07 ENCOUNTER — HOSPITAL ENCOUNTER (OUTPATIENT)
Dept: PHYSICAL THERAPY | Age: 75
Discharge: HOME | End: 2020-01-07
Payer: MEDICARE

## 2020-01-07 PROCEDURE — 97112 NEUROMUSCULAR REEDUCATION: CPT

## 2020-01-07 NOTE — PROGRESS NOTES
PHYSICAL THERAPY - DAILY TREATMENT NOTE    Patient Name: Jun Maurer        Date: 2020  : 1945   YES Patient  Verified  Visit #:     Insurance: Payor: Brina Orosco / Plan: [place] MED[place] PART A & B / Product Type: Medicare /      In time: 10:03 Out time: 10:36   Total Treatment Time: 33     Medicare/BCBS [place] Time Tracking (below)   Total Timed Codes (min):  33 1:1 Treatment Time:  33     TREATMENT AREA =  Vestibular dysfunction    SUBJECTIVE    Pain Level (on 0 to 10 scale):  0  / 10   Medication Changes/New allergies or changes in medical history, any new surgeries or procedures?    no    If yes, update Summary List   Subjective Functional Status/Changes:  []  No changes reported     Reports compliance with HEP. Reports she has been performing modified tandem walk with arms out to side. OBJECTIVE    33 min Neuromuscular Re-ed:    Rationale:      improve coordination, improve balance and increase proprioception to improve the patients ability to perform ADL's, gait, and functional mobility with increased safety and independence. min Patient Education:  YES  Reviewed HEP   []  Progressed/Changed HEP based on:   Progressed HEP per handout; advised patient to perform modified tandem gait with arms down by side     Other Objective/Functional Measures:    VSE: stand on AirEx with background    EO MSR(tip of toe): 30\" B  EC MSR(instep): 30\" B    EO MSR(GT) on AirEx: 30\" B (mild sway)  EC narrow stance on AirEx: 30\"    Dynamic gait exercises: 1) gait with horizontal and vertical head turns X 70' each, 2) modified tandem gait X 50'     Post Treatment Pain Level (on 0 to 10) scale:   0  / 10     ASSESSMENT    Assessment/Changes in Function:     Able to progress EO/EC balance exercises today.      []  See Progress Note/Recertification   Patient will continue to benefit from skilled PT services to modify and progress therapeutic interventions, address functional mobility deficits, address ROM deficits, address strength deficits, analyze and address soft tissue restrictions, analyze and cue movement patterns, analyze and modify body mechanics/ergonomics, assess and modify postural abnormalities, address imbalance/dizziness and instruct in home and community integration to attain remaining goals. Progress toward goals / Updated goals:    · Long Term Goals: To be accomplished in  4-8  weeks:  1. Patient will demonstrate independence with HEP. reports compliance with HEP  2. Patient will maintain single leg stance 5\"B to increase safety with curb/stair negotiation and decrease fall risk. 3. Patient will score greater than or equal to 22/30 on FGA to indicate improved gait/decreased fall risk. Cont static and dynamic standing balance exercises.      PLAN    [x]  Upgrade activities as tolerated YES Continue plan of care   []  Discharge due to :    []  Other:      Therapist: Josey Ambrocio PT    Date: 1/7/2020 Time: 8:45 AM     Future Appointments   Date Time Provider Yocasta Loaiza   1/7/2020 10:30 AM Simin Canela PT [facility]   1/9/2020  2:00 PM Simin Canela PT [facility]   1/14/2020 11:00 AM Simin Canela PT [facility]   1/16/2020 11:00 AM Simin Canela PT [facility]   1/21/2020 11:00 AM Simin Canela PT [facility]   1/23/2020 11:00 AM Simin Canela PT [facility]   1/28/2020 11:00 AM Simin Canela PT [facility]   1/30/2020 10:30 AM Simin Canela PT [facility]

## 2020-01-09 ENCOUNTER — HOSPITAL ENCOUNTER (OUTPATIENT)
Dept: PHYSICAL THERAPY | Age: 75
Discharge: HOME | End: 2020-01-09
Payer: MEDICARE

## 2020-01-09 PROCEDURE — 97110 THERAPEUTIC EXERCISES: CPT

## 2020-01-09 NOTE — PROGRESS NOTES
PHYSICAL THERAPY - DAILY TREATMENT NOTE    Patient Name: Capo Dan        Date: 2020  : 1945   YES Patient  Verified  Visit #:     Insurance: Payor: Mirela Rich / Plan: [place] MEDICARE PART A & B / Product Type: Medicare /      In time: 2:05 Out time: 2:35   Total Treatment Time: 30     Medicare/BCBS [place] Time Tracking (below)   Total Timed Codes (min):  30 1:1 Treatment Time:  30     TREATMENT AREA =  Vestibular dysfunction    SUBJECTIVE    Pain Level (on 0 to 10 scale):  0  / 10   Medication Changes/New allergies or changes in medical history, any new surgeries or procedures?    no    If yes, update Summary List   Subjective Functional Status/Changes:  []  No changes reported     \"I didn't have a great night last night. I didn't sleep well so I am tired today. \"         OBJECTIVE    30 min Therapeutic Exercise:  [x]  See flow sheet   Rationale:      increase ROM, increase strength, improve coordination, improve balance and increase proprioception to improve the patients ability to perform ADL's, gait, and functional mobility with       min Patient Education:  YES  Reviewed HEP   []  Progressed/Changed HEP based on:   Cont HEP     Other Objective/Functional Measures:    Increased reps with minisquats and side lying clams. Added sit to stand transfers. Post Treatment Pain Level (on 0 to 10) scale:   0  / 10     ASSESSMENT    Assessment/Changes in Function:     Tolerated exercises progression without c/o's. Patient requires verbal cues to avoid B knee valgus during minisquats.      []  See Progress Note/Recertification   Patient will continue to benefit from skilled PT services to modify and progress therapeutic interventions, address functional mobility deficits, address ROM deficits, address strength deficits, analyze and address soft tissue restrictions, analyze and cue movement patterns, analyze and modify body mechanics/ergonomics, assess and modify postural abnormalities, address imbalance/dizziness and instruct in home and community integration to attain remaining goals. Progress toward goals / Updated goals:    · Long Term Goals: To be accomplished in  4-8  weeks:  1. Patient will demonstrate independence with HEP.  Reports compliance with HEP  2. Patient will maintain single leg stance 5\"B to increase safety with curb/stair negotiation and decrease fall risk. 3. Patient will score greater than or equal to 22/30 on FGA to indicate improved gait/decreased fall risk. Cont B LE strengthening.      PLAN    [x]  Upgrade activities as tolerated YES Continue plan of care   []  Discharge due to :    []  Other:      Therapist: Molly Mar PT    Date: 1/9/2020 Time: 8:27 AM     Future Appointments   Date Time Provider Yocasta Loaiza   1/9/2020  2:00 PM Mislyudmila Ferguson, PT [facility]   1/14/2020 11:00 AM Mis Din, PT [facility]   1/16/2020 11:00 AM Mis Din, PT [facility]   1/21/2020 11:00 AM Mis Din, PT [facility]   1/23/2020 11:00 AM Mis Din, PT [facility]   1/28/2020 11:00 AM Mis Din, PT [facility]   1/30/2020 10:30 AM Mis Din, PT [facility]

## 2020-01-14 ENCOUNTER — HOSPITAL ENCOUNTER (OUTPATIENT)
Dept: PHYSICAL THERAPY | Age: 75
Discharge: HOME | End: 2020-01-14
Payer: MEDICARE

## 2020-01-14 PROCEDURE — 97112 NEUROMUSCULAR REEDUCATION: CPT

## 2020-01-14 NOTE — PROGRESS NOTES
PHYSICAL THERAPY - DAILY TREATMENT NOTE    Patient Name: Luke Clock        Date: 2020  : 1945   YES Patient  Verified  Visit #:     Insurance: Payor: Jelena Douglas / Plan: [place] MEDICARE PART A & B / Product Type: Medicare /      In time: 3:21 Out time: 3:50   Total Treatment Time: 29     Medicare/BCBS [place] Time Tracking (below)   Total Timed Codes (min):  29 1:1 Treatment Time:  29     TREATMENT AREA =  Vestibular dysfunction    SUBJECTIVE    Pain Level (on 0 to 10 scale):  0  / 10   Medication Changes/New allergies or changes in medical history, any new surgeries or procedures?    no    If yes, update Summary List   Subjective Functional Status/Changes:  []  No changes reported     \"I went for a long walk on Saturday (about a mile) and when I got back I felt disoriented. Since then, I haven't been able to do the exercises as good as I used to. \"          OBJECTIVE    29 min Neuromuscular Re-ed:    Rationale:      improve coordination, improve balance and increase proprioception to improve the patients ability to perform ADL's, gait, and functional mobility with increased safety and independence. min Patient Education:  YES  Reviewed HEP   []  Progressed/Changed HEP based on:   Progressed HEP per handout (EO MSR(GT) on AirEx and EC narrow stance on AirEx; VSE Romberg on AirEx with background     Other Objective/Functional Measures:    VSE: Romberg with background on AirEx    EO SR: left 20\"/right 30\" (+ sway)  EC MSR(bun): left 3\"/right 30\"    EO MSR(GT) on AirEx: 30\" B  EC narrow stance on AirEx: 30\"    Dynamic gait as follows: 1) gait with horizontal and vertical head turns x 70' each, 2) modified tandem x 30', 3) tandem gait x 30'     Post Treatment Pain Level (on 0 to 10) scale:   0  / 10     ASSESSMENT    Assessment/Changes in Function:     Patient challenged with modified tandem and tandem gait.      []  See Progress Note/Recertification   Patient will continue to benefit from skilled PT services to modify and progress therapeutic interventions, address functional mobility deficits, address ROM deficits, address strength deficits, analyze and address soft tissue restrictions, analyze and cue movement patterns, analyze and modify body mechanics/ergonomics, assess and modify postural abnormalities, address imbalance/dizziness and instruct in home and community integration to attain remaining goals. Progress toward goals / Updated goals:    · Long Term Goals: To be accomplished in  4-8  weeks:  1. Patient will demonstrate independence with HEP.  Reports compliance with HEP  2. Patient will maintain single leg stance 5\"B to increase safety with curb/stair negotiation and decrease fall risk.   3. Patient will score greater than or equal to 22/30 on FGA to indicate improved gait/decreased fall risk.  performed dynamic gait exercises as above       PLAN    [x]  Upgrade activities as tolerated YES Continue plan of care   []  Discharge due to :    []  Other:      Therapist: Grace Townsend PT    Date: 1/14/2020 Time: 8:08 AM     Future Appointments   Date Time Provider Yocasta Loaiza   1/14/2020  3:30 PM Brittani Hampton PT [facility]   1/16/2020 11:00 AM Brittani Hampton PT [facility]   1/21/2020 11:00 AM Brittani Hampton PT [facility]   1/23/2020 11:00 AM Brittani Hampton PT [facility]   1/28/2020 11:00 AM Brittani Hampton PT [facility]   1/30/2020 10:30 AM Brittani Hampton PT [facility]

## 2020-01-16 ENCOUNTER — HOSPITAL ENCOUNTER (OUTPATIENT)
Dept: PHYSICAL THERAPY | Age: 75
Discharge: HOME | End: 2020-01-16
Payer: MEDICARE

## 2020-01-16 PROCEDURE — 97110 THERAPEUTIC EXERCISES: CPT

## 2020-01-16 NOTE — PROGRESS NOTES
PHYSICAL THERAPY - DAILY TREATMENT NOTE    Patient Name: Payton Islas        Date: 2020  : 1945   YES Patient  Verified  Visit #:   10   of   8-16  Insurance: Payor: Natalie Menjivar / Plan: [place] MEDICARE PART A & B / Product Type: Medicare /      In time: 11:02 Out time: 11:30   Total Treatment Time: 28     Medicare/BCBS [place] Time Tracking (below)   Total Timed Codes (min):  28 1:1 Treatment Time:  28     TREATMENT AREA =  Vestibular dysfunction    SUBJECTIVE    Pain Level (on 0 to 10 scale):  0  / 10   Medication Changes/New allergies or changes in medical history, any new surgeries or procedures?    no    If yes, update Summary List   Subjective Functional Status/Changes:  []  No changes reported     Report compliance with HEP. OBJECTIVE    28 min Therapeutic Exercise:  [x]  See flow sheet   Rationale:      increase ROM, increase strength, improve coordination, improve balance and increase proprioception to improve the patients ability to perform ADL's, gait, and functional mobility with increased safety and independence. min Patient Education:  YES  Reviewed HEP   []  Progressed/Changed HEP based on:   Cont HEP     Other Objective/Functional Measures:    Increased resistance with standing hip 3 way. Added LAQ, standing knee flexion, and lateral step ups. Performed forward step ups without UE support.      Post Treatment Pain Level (on 0 to 10) scale:   0  / 10     ASSESSMENT    Assessment/Changes in Function:     Patient challenged with step ups on ascending with left LE.     []  See Progress Note/Recertification   Patient will continue to benefit from skilled PT services to modify and progress therapeutic interventions, address functional mobility deficits, address ROM deficits, address strength deficits, analyze and address soft tissue restrictions, analyze and cue movement patterns, analyze and modify body mechanics/ergonomics, assess and modify postural abnormalities, address imbalance/dizziness and instruct in home and community integration to attain remaining goals. Progress toward goals / Updated goals:    · Long Term Goals: To be accomplished in  4-8  weeks:  1. Patient will demonstrate independence with HEP.  Reports compliance with HEP  2. Patient will maintain single leg stance 5\"B to increase safety with curb/stair negotiation and decrease fall risk.   3. Patient will score greater than or equal to 22/30 on FGA to indicate improved gait/decreased fall risk.  Cont B LE strengthening exercises      PLAN    [x]  Upgrade activities as tolerated YES Continue plan of care   []  Discharge due to :    []  Other:      Therapist: Charity Cedillo PT    Date: 1/16/2020 Time: 11:05 AM     Future Appointments   Date Time Provider Yocasta Loaiza   1/22/2020  2:00 PM Lisandro Villarreal PT [facility]   1/24/2020  9:30 AM Lisandro Villarreal PT [facility]   1/28/2020 11:00 AM Lisandro Villarreal PT [facility]   1/30/2020 10:30 AM Lisandro Villarreal PT [facility]

## 2020-01-21 ENCOUNTER — APPOINTMENT (OUTPATIENT)
Dept: PHYSICAL THERAPY | Age: 75
End: 2020-01-21
Payer: MEDICARE

## 2020-01-22 ENCOUNTER — HOSPITAL ENCOUNTER (OUTPATIENT)
Dept: PHYSICAL THERAPY | Age: 75
Discharge: HOME | End: 2020-01-22
Payer: MEDICARE

## 2020-01-22 PROCEDURE — 97112 NEUROMUSCULAR REEDUCATION: CPT

## 2020-01-22 NOTE — PROGRESS NOTES
PHYSICAL THERAPY - DAILY TREATMENT NOTE    Patient Name: Javier Gutierrez        Date: 2020  : 1945   YES Patient  Verified  Visit #:     Insurance: Payor: Juan R Tan / Plan: [place] MEDICARE PART A & B / Product Type: Medicare /      In time: 2:00 Out time: 2:28   Total Treatment Time: 28     Medicare/BCBS [place] Time Tracking (below)   Total Timed Codes (min):  28 1:1 Treatment Time:  28     TREATMENT AREA =  Vestibular dysfunction    SUBJECTIVE    Pain Level (on 0 to 10 scale):  0  / 10   Medication Changes/New allergies or changes in medical history, any new surgeries or procedures?    no    If yes, update Summary List   Subjective Functional Status/Changes:  []  No changes reported     It hasn't been a great week. My  has memory issues and it hasn't been good. \"  Continues to report compliance with HEP. Reports exercises are still challenging. Reports she is still doing better with ambulation in home and community. OBJECTIVE    28 min Neuromuscular Re-ed:    Rationale:      improve coordination, improve balance and increase proprioception to improve the patients ability to perform ADL's, gait, and functional mobility with increased safety and independence. min Patient Education:  YES  Reviewed HEP   []  Progressed/Changed HEP based on:   Cont current HEP     Other Objective/Functional Measures:    EO SR: left 7\"/right 30\"  EC MSR(bun): 30\" B (+ sway)    EO SLS: right 5\"/left 3\"    EO MSR(GT) on AirEx: 30\" B  EC ROM: 27\" (+ sway)    Dynamic gait as follows: 1) gait with horizontal and vertical head turns X 30' each, 2) quick walk with horizontal and vertical head turns X 30' each, 3) backward gait X 30', 4) modified tandem X 30', 5) tandem X 30'. Post Treatment Pain Level (on 0 to 10) scale:   0  / 10     ASSESSMENT    Assessment/Changes in Function:     Unable to progress EO/EC balance exercises today.   Able to perform dynamic gait exercises with supervision. []  See Progress Note/Recertification   Patient will continue to benefit from skilled PT services to modify and progress therapeutic interventions, address functional mobility deficits, address ROM deficits, address strength deficits, analyze and address soft tissue restrictions, analyze and cue movement patterns, analyze and modify body mechanics/ergonomics, assess and modify postural abnormalities, address imbalance/dizziness and instruct in home and community integration to attain remaining goals. Progress toward goals / Updated goals:    · Long Term Goals: To be accomplished in  4-8  weeks:  1. Patient will demonstrate independence with HEP.  Reports compliance with HEP  2. Patient will maintain single leg stance 5\"B to increase safety with curb/stair negotiation and decrease fall risk. EO SLS: right 5\"/left 3\"  3. Patient will score greater than or equal to 22/30 on FGA to indicate improved gait/decreased fall risk. performed dynamic gait exercises as above.      PLAN    [x]  Upgrade activities as tolerated YES Continue plan of care   []  Discharge due to :    []  Other:      Therapist: Dawn Obando PT    Date: 1/22/2020 Time: 7:33 AM     Future Appointments   Date Time Provider Yocasta Loaiza   1/22/2020  2:00 PM Aggie Grover PT [facility]   1/24/2020  9:30 AM Aggie Grover PT [facility]   1/28/2020 11:00 AM Aggie Grover PT [facility]   1/30/2020 10:30 AM Aggie Grover PT [facility]

## 2020-01-23 ENCOUNTER — APPOINTMENT (OUTPATIENT)
Dept: PHYSICAL THERAPY | Age: 75
End: 2020-01-23
Payer: MEDICARE

## 2020-01-24 ENCOUNTER — HOSPITAL ENCOUNTER (OUTPATIENT)
Dept: PHYSICAL THERAPY | Age: 75
Discharge: HOME | End: 2020-01-24
Payer: MEDICARE

## 2020-01-24 PROCEDURE — 97110 THERAPEUTIC EXERCISES: CPT

## 2020-01-24 NOTE — PROGRESS NOTES
PHYSICAL THERAPY - DAILY TREATMENT NOTE    Patient Name: Magdaleno Jaramillo        Date: 2020  : 1945   YES Patient  Verified  Visit #:     Insurance: Payor: Victor Manuel Many / Plan: [place] MEDICARE PART A & B / Product Type: Medicare /      In time: 9:30 Out time: 10:12   Total Treatment Time: 42     Medicare/BCBS [place] Time Tracking (below)   Total Timed Codes (min):  42 1:1 Treatment Time:  34     TREATMENT AREA =  Vestibular dysfunction    SUBJECTIVE    Pain Level (on 0 to 10 scale):  0  / 10   Medication Changes/New allergies or changes in medical history, any new surgeries or procedures?    no    If yes, update Summary List   Subjective Functional Status/Changes:  []  No changes reported     Reports compliance with HEP. OBJECTIVE      42 (34)  min Therapeutic Exercise:  [x]  See flow sheet   Rationale:      increase ROM, increase strength, improve coordination, improve balance and increase proprioception to improve the patients ability to perform ADL's, gait, and functional mobility with increased safety and independence. min Patient Education:  YES  Reviewed HEP   []  Progressed/Changed HEP based on:   Cont HEP     Other Objective/Functional Measures:    Attempted increasing resistance with minisquats with 5 pound kettle bell, but patient demonstrated incorrect form and increased B knee pain. Increased reps with sidelying clam     Post Treatment Pain Level (on 0 to 10) scale:   0  / 10     ASSESSMENT    Assessment/Changes in Function:     Required verbal cues and demonstration for correct form with minisquats.      []  See Progress Note/Recertification   Patient will continue to benefit from skilled PT services to modify and progress therapeutic interventions, address functional mobility deficits, address ROM deficits, address strength deficits, analyze and address soft tissue restrictions, analyze and cue movement patterns, analyze and modify body mechanics/ergonomics, assess and modify postural abnormalities, address imbalance/dizziness and instruct in home and community integration to attain remaining goals. Progress toward goals / Updated goals:    · Long Term Goals: To be accomplished in  4-8  weeks:  1. Patient will demonstrate independence with HEP.  Reports compliance with HEP  2. Patient will maintain single leg stance 5\"B to increase safety with curb/stair negotiation and decrease fall risk. 3. Patient will score greater than or equal to 22/30 on FGA to indicate improved gait/decreased fall risk.  performed LE strengthening exercises.      PLAN    [x]  Upgrade activities as tolerated YES Continue plan of care   []  Discharge due to :    []  Other:      Therapist: Letty Barba PT    Date: 1/24/2020 Time: 7:57 AM     Future Appointments   Date Time Provider Yocasta Loaiza   1/24/2020  9:30 AM Yuliet Stanley PT [facility]   1/28/2020 11:00 AM Yuliet Stanley PT [facility]   1/30/2020 10:30 AM Yuliet Stanley PT [facility]

## 2020-01-28 ENCOUNTER — HOSPITAL ENCOUNTER (OUTPATIENT)
Dept: PHYSICAL THERAPY | Age: 75
Discharge: HOME | End: 2020-01-28
Payer: MEDICARE

## 2020-01-28 PROCEDURE — 97112 NEUROMUSCULAR REEDUCATION: CPT

## 2020-01-28 NOTE — PROGRESS NOTES
PHYSICAL THERAPY - DAILY TREATMENT NOTE    Patient Name: Julieta Candelario        Date: 2020  : 1945   YES Patient  Verified  Visit #:     Insurance: Payor: Leslie Weir / Plan: [place] MED[place] PART A & B / Product Type: Medicare /      In time: 11:07 Out time: 11:40   Total Treatment Time: 33     Medicare/BCBS [place] Time Tracking (below)   Total Timed Codes (min):  33 1:1 Treatment Time:  33     TREATMENT AREA =  Vestibular dysfunction    SUBJECTIVE    Pain Level (on 0 to 10 scale):  0  / 10   Medication Changes/New allergies or changes in medical history, any new surgeries or procedures?    no    If yes, update Summary List   Subjective Functional Status/Changes:  []  No changes reported     Reports she feels like her balance is getting worse. States \"ever since I went for that long walk it seems like I have been worse. \"   Upon further discussion, patient reports she has good days and bad days and that today is a bad day. States she is under a lot of stress today regarding her 's medications. Patient reports a 35% improvement with balance since beginning therapy. OBJECTIVE    33 min Neuromuscular Re-ed:    Rationale:      increase ROM, increase strength, improve coordination, improve balance and increase proprioception to improve the patients ability to perform ADL's, gait, and functional mobility with increased safety and independence. min Patient Education:  YES  Reviewed HEP   []  Progressed/Changed HEP based on:   Regressed EO balance on foam due to difficulty with current exercise.      Other Objective/Functional Measures:    EO SR: left 3\", 30\"/right 30\"  EC MSR(bun): left 30\"/right 14\"    EO MSR(GT) on AirEx: unable  EO MSR(bun): 30\" B  EC narrow stance on AirEx: 30\"  EC ROM on AirEx: 30\" (+ sway)    EO SLS: left 3\"/right 4\"    FGA = 24/30     Strength (1-5)  Hip Left Right   Flexion 4- 4-   Extension 4- 4-   Abduction 4 4   Adduction 4 4   Knee Left Right Extension 5 5   Flexion 4 4     10 Meter Walk Test = 11\" which correlates to a gait speed of 0.909 meters/sec and indicates she is a community ambulator. Post Treatment Pain Level (on 0 to 10) scale:   0  / 10     ASSESSMENT    Assessment/Changes in Function:     See PN to MD     []  See Progress Note/Recertification   Patient will continue to benefit from skilled PT services to modify and progress therapeutic interventions, address functional mobility deficits, address ROM deficits, address strength deficits, analyze and address soft tissue restrictions, analyze and cue movement patterns, analyze and modify body mechanics/ergonomics, assess and modify postural abnormalities, address imbalance/dizziness and instruct in home and community integration to attain remaining goals.      Progress toward goals / Updated goals:    See PN to MD     PLAN    [x]  Upgrade activities as tolerated YES Continue plan of care   []  Discharge due to :    []  Other:      Therapist: Lonnie Combs PT    Date: 1/28/2020 Time: 7:52 AM     Future Appointments   Date Time Provider Yocasta Loaiza   1/28/2020 11:00 AM Shane Santana PT [facility]   1/30/2020 10:30 AM Shane Santana PT [facility]

## 2020-01-28 NOTE — PROGRESS NOTES
[address] [facility] PHYSICAL THERAPY  [address] [facility] [address] [address], [address] - Phone: [phone]  Fax: [phone]  CARE FOR PHYSICAL THERAPY          Patient Name: Adolph Blandon : 1945   Treatment/Medical Diagnosis: Gait instability [R26.81]   Onset Date: 2 years ago with recent exacerbation2    Referral Source: Cordell Hampton MD [place] [facility]): 2019   Prior Hospitalization: See Medical History Provider #: [id number]   Prior Level of Function: Independent with ADLs, ambulation   Comorbidities: Prior h/o vertigo, arthritis, right THR, B hand tremors   Medications: Verified on Patient Summary List   Visits from [facility]: 13 Missed Visits: 1     Goal/Measure of Progress Goal Met? 1. Patient will demonstrate independence with HEP. Status at last Eval: Partial compliance with HEP Current Status: Compliant with HEP progressing   2. Patient will maintain single leg stance 5\"B to increase safety with curb/stair negotiation and decrease fall risk. Status at last Eval: 2\" B Current Status: Left 3\"/right 4\" progressing   3. Patient will score greater than or equal to 22/30 on FGA to indicate improved gait/decreased fall risk. Status at last Eval:  Current Status: 24/30 yes     Key Functional Changes/Progress: Patient continues with slow progress with physical therapy. Her Functional Gait Assessment (FGA) score increased by 3 points since last assessment (and by 6 points since eval) indicating increased safety with gait. She has reached a plateau with static standing balance exercises. She continues to demonstrate decreased strength in B LE's and difficulty with dynamic gait activities (stepping over obstacles, walking with narrow base of support, walking backward, and with eyes closed).                                              Strength (1-5)  Hip Left Right   Flexion 4- 4-   Extension 4- 4-   Abduction 4 4   Adduction 4 4   Knee Left Right   Extension 5 5   Flexion 4 4     10 Meter Walk Test = 11\" which correlates to a gait speed of 0.909 meters/sec and indicates she is a community ambulator. Problem List: decrease strength, impaired gait/ balance, decrease ADL/ functional abilitiies, decrease activity tolerance, decrease flexibility/ joint mobility and decrease transfer abilities   Treatment Plan may include any combination of the following: Therapeutic exercise, Therapeutic activities, Neuromuscular re-education, Physical agent/modality, Gait/balance training and Patient education  Patient Goal(s) has been updated and includes:      Goals for this certification period include and are to be achieved in   3-4  weeks:  1. Patient will demonstrate independence with HEP. 2.  Patient will maintain single leg stance 5\"B to increase safety with curb/stair negotiation and decrease fall risk. 3.  Patient will score > 26/30 on the FGA to indicate increased safety with gait. Frequency / Duration:   Patient to be seen   2   times per week for   3-4    weeks:    Assessments/Recommendations: Continue skilled PT at above frequency to shift focus onto B LE strengthening and dynamic balance exercises. If you have any questions/comments please contact us directly at [phone]. Thank you for allowing us to assist in the care of your patient. Therapist Signature: Julian Mann PT Date: 6/63/5244   Certification Period:  Reporting Period: 12/2/2019-3/1/2020  12/31/2019 - 1/28/2020 Time: 1:04 PM   NOTE TO PHYSICIAN:  PLEASE COMPLETE THE ORDERS BELOW AND FAX TO   [facility]: [phone]   If you are unable to process this request in 24 hours please contact our office: [phone].    ___ I have read the above report and request that my patient continue as recommended.   ___ I have read the above report and request that my patient continue therapy with the following changes/special instructions:_________________________________________________________   ___ I have read the above report and request that my patient be discharged from therapy.      Physician Signature:        Date:       Time:

## 2020-01-30 ENCOUNTER — APPOINTMENT (OUTPATIENT)
Dept: PHYSICAL THERAPY | Age: 75
End: 2020-01-30
Payer: MEDICARE

## 2020-02-04 ENCOUNTER — HOSPITAL ENCOUNTER (OUTPATIENT)
Dept: PHYSICAL THERAPY | Age: 75
Discharge: HOME | End: 2020-02-04
Payer: MEDICARE

## 2020-02-04 PROCEDURE — 97110 THERAPEUTIC EXERCISES: CPT

## 2020-02-04 NOTE — PROGRESS NOTES
PHYSICAL THERAPY - DAILY TREATMENT NOTE    Patient Name: Jorje Boone        Date: 2020  : 1945   YES Patient  Verified  Visit #:     Insurance: Payor: Belinda Cantor / Plan: [place] MEDICARE PART A & B / Product Type: Medicare /      In time: 3:28 Out time: 4:02   Total Treatment Time: 34     Medicare/BCBS [place] Time Tracking (below)   Total Timed Codes (min):  34 1:1 Treatment Time:  34     TREATMENT AREA =  Vestibular dysfunction    SUBJECTIVE    Pain Level (on 0 to 10 scale):  0  / 10   Medication Changes/New allergies or changes in medical history, any new surgeries or procedures?    no    If yes, update Summary List   Subjective Functional Status/Changes:  []  No changes reported     \"I went to work out this morning and I am a little sore. I just did a ski machine for about 3 minutes and the NuStep for 3 minutes. \"          OBJECTIVE    34 min Therapeutic Exercise:  [x]  See flow sheet   Rationale:      increase ROM, increase strength, improve coordination, improve balance and increase proprioception to improve the patients ability to perform ADL's, gait, and functional mobility with increased safety and independence. min Patient Education:  YES  Reviewed HEP   []  Progressed/Changed HEP based on: Other Objective/Functional Measures: Added kickstarters and side stepping with orange theraband for resistance. Post Treatment Pain Level (on 0 to 10) scale:   0  / 10     ASSESSMENT    Assessment/Changes in Function:     Patient fatigued with exercises.      []  See Progress Note/Recertification   Patient will continue to benefit from skilled PT services to modify and progress therapeutic interventions, address functional mobility deficits, address ROM deficits, address strength deficits, analyze and address soft tissue restrictions, analyze and cue movement patterns, analyze and modify body mechanics/ergonomics, assess and modify postural abnormalities, address imbalance/dizziness and instruct in home and community integration to attain remaining goals. Progress toward goals / Updated goals:    · Goals for this certification period include and are to be achieved in   3-4  weeks:  1. Patient will demonstrate independence with HEP. Reports compliance with HEP. 2.  Patient will maintain single leg stance 5\"B to increase safety with curb/stair negotiation and decrease fall risk. 3.  Patient will score > 26/30 on the FGA to indicate increased safety with gait.      PLAN    [x]  Upgrade activities as tolerated YES Continue plan of care   []  Discharge due to :    []  Other:      Therapist: Bonita Astudillo PT    Date: 2/4/2020 Time: 8:47 AM     Future Appointments   Date Time Provider Yocasta Loaiza   2/4/2020  3:30 PM Chester Marshall PT [facility]   2/6/2020  1:30 PM Chester Marshall PT [facility]   2/14/2020  1:30 PM Chester Marshall PT [facility]   2/18/2020  4:00 PM Chester Marshall PT [facility]   2/20/2020  9:30 AM Chester Marshall PT [facility]   2/25/2020 11:00 AM Chester Marshall PT [facility]   2/27/2020 10:00 AM Chester Marshall PT [facility]

## 2020-02-06 ENCOUNTER — HOSPITAL ENCOUNTER (OUTPATIENT)
Dept: PHYSICAL THERAPY | Age: 75
Discharge: HOME | End: 2020-02-06
Payer: MEDICARE

## 2020-02-06 PROCEDURE — 97530 THERAPEUTIC ACTIVITIES: CPT

## 2020-02-06 NOTE — PROGRESS NOTES
PHYSICAL THERAPY - DAILY TREATMENT NOTE    Patient Name: Radha Vasquez        Date: 2020  : 1945   YES Patient  Verified  Visit #:   15   of   21  Insurance: Payor: Arlene Weems / Plan: [place] MEDICARE PART A & B / Product Type: Medicare /      In time: 1:17 Out time: 1:48   Total Treatment Time: 31     Medicare/BCBS [place] Time Tracking (below)   Total Timed Codes (min):  31 1:1 Treatment Time:  31     TREATMENT AREA =  Vestibular dysfunction    SUBJECTIVE    Pain Level (on 0 to 10 scale):  0  / 10   Medication Changes/New allergies or changes in medical history, any new surgeries or procedures?    no    If yes, update Summary List   Subjective Functional Status/Changes:  []  No changes reported     \"My sister thinks I am using all of my medicare dollars with this therapy. She is afraid I am not going to have any left toward the end of the year. \"          OBJECTIVE    31 min Therapeutic Activity:    Rationale:      increase ROM, increase strength, improve coordination, improve balance and increase proprioception to improve the patients ability to perform ADL's, gait, and functional mobility with increased safety and independence. min Patient Education:  YES  Reviewed HEP   []  Progressed/Changed HEP based on:   Cont HEP; discussed decreasing frequency of PT to 1x/week or stopping PT with HEP. Patient to decide what she wants to do and let us know. Other Objective/Functional Measures:    Therapist facilitated gait with turns by having patient walk around square made by 4 cones (2x each direction). Therapist then called color of cone and patient walked to specific cones and walked around cone (performed for ~ 4 minutes). Patient performed PWR! Twist in standing, holding cone and touching cone to stacked cones (3) on floor and wall. Performed PWR!  Rock in standing      Post Treatment Pain Level (on 0 to 10) scale:   0  / 10     ASSESSMENT    Assessment/Changes in Function:     Patient with reports of feelings of imbalance during walking from cone to cones with therapist facilitating turns. []  See Progress Note/Recertification   Patient will continue to benefit from skilled PT services to modify and progress therapeutic interventions, address functional mobility deficits, address ROM deficits, address strength deficits, analyze and address soft tissue restrictions, analyze and cue movement patterns, analyze and modify body mechanics/ergonomics, assess and modify postural abnormalities, address imbalance/dizziness and instruct in home and community integration to attain remaining goals. Progress toward goals / Updated goals:    · Goals for this certification period include and are to be achieved in   3-4  weeks:  1.  Patient will demonstrate independence with HEP. Reports compliance with HEP. 2.  Patient will maintain single leg stance 5\"B to increase safety with curb/stair negotiation and decrease fall risk. Added advancing tap ups; performed marching. 3.  Patient will score > 26/30 on the FGA to indicate increased safety with gait. Performed dynamic gait activities as above.      PLAN    [x]  Upgrade activities as tolerated YES Continue plan of care   []  Discharge due to :    []  Other:      Therapist: Molly Mar PT    Date: 2/6/2020 Time: 1:19 PM     Future Appointments   Date Time Provider Yocasta Loaiza   2/6/2020  1:30 PM Mis Din, PT [facility]   2/14/2020  1:30 PM Mis Din, PT [facility]   2/18/2020  4:00 PM Mis Din, PT [facility]   2/20/2020  9:30 AM Mis Din, PT [facility]   2/25/2020 11:00 AM Mis Din, PT [facility]   2/27/2020 10:00 AM Mis Din, PT [facility]

## 2020-02-14 ENCOUNTER — HOSPITAL ENCOUNTER (OUTPATIENT)
Dept: PHYSICAL THERAPY | Age: 75
Discharge: HOME | End: 2020-02-14
Payer: MEDICARE

## 2020-02-14 PROCEDURE — 97530 THERAPEUTIC ACTIVITIES: CPT

## 2020-02-14 NOTE — PROGRESS NOTES
[address]  [facility] PHYSICAL THERAPY  [address] [facility], [address] - Phone: [phone]  Fax: [phone] SUMMARY FOR PHYSICAL THERAPY          Patient Name: Tirso Jones : 1945   Treatment/Medical Diagnosis: Gait instability [R26.81]   Onset Date: 2 years ago with recent exacerbation    Referral Source: Inga Mattson MD [facility]): 2019   Prior Hospitalization: See Medical History Provider #: [id number]   Prior Level of Function: Independent with ADLs, ambulation   Comorbidities: Prior h/o vertigo, arthritis, right THR, B hand tremors   Medications: Verified on Patient Summary List   Visits from [facility]: 16 Missed Visits: 2     Goal/Measure of Progress Goal Met? 1. Patient will demonstrate independence with HEP. Status at last Eval: Compliant with HEP Current Status: independent with HEP yes   2. Patient will maintain single leg stance 5\"B to increase safety with curb/stair negotiation and decrease fall risk. Status at last Eval: Left 3\"/right 4\" Current Status: right 7\"/left: 4\" progressing   3. Patient will score > 26/30 on the FGA to indicate increased safety with gait. Status at last Eval:  Current Status:  no     Key Functional Changes/Progress: Patient requests discharge at this time due to medicare cap. She has progressed well with physical therapy since her evaluation. Her Functional Gait Assessment score is unchanged since last assessment but has increased 6 points since eval and indicates she is not at an elevated fall risk. B LE strength has progressed since last assessment (see below). Her FOTO (Focused on Therapeutic Outcomes) functional status score increased from 56/100 to 64/100, indicating increased tolerance with functional activities. She has been instructed in a home exercise program and reports independence with these exercises.     Strength (1-5)  Hip Left Right   Flexion 4- 4-   Extension 4 4   Abduction 4+ 4+   Adduction 4+ 4+   Knee Left Right   Extension 5 5   Flexion 5      Assessments/Recommendations: Discontinue therapy. Progressing towards or have reached established goals. If you have any questions/comments please contact us directly at [phone]. Thank you for allowing us to assist in the care of your patient. Therapist Signature: Bonita Astudillo, PT Date: 8/80/9463   Certification Period  Reporting Period: 12/2/2019-3/1/2020  1/29/2020 - 2/14/2020 Time: 3:30 PM     NOTE TO PHYSICIAN:  PLEASE COMPLETE THE ORDERS BELOW AND FAX TO   [facility]: [phone] If you are unable to process this request in 24 hours please contact our office: [phone].    ___ I have read the above report and request that my patient be discharged from therapy.      Physician Signature:        Date:       Time:

## 2020-02-14 NOTE — PROGRESS NOTES
PHYSICAL THERAPY - DAILY TREATMENT NOTE    Patient Name: Veronica Arzola        Date: 2020  : 1945   YES Patient  Verified  Visit #:     Insurance: Payor: Pastor Calhounsh / Plan: [place] MEDICARE PART A & B / Product Type: Medicare /      In time: 1:35 Out time: 2:00   Total Treatment Time: 25     Medicare/BCBS [place] Time Tracking (below)   Total Timed Codes (min):  25 1:1 Treatment Time:  25     TREATMENT AREA =  Vestibular dysfunction    SUBJECTIVE    Pain Level (on 0 to 10 scale):  0  / 10   Medication Changes/New allergies or changes in medical history, any new surgeries or procedures?    no    If yes, update Summary List   Subjective Functional Status/Changes:  []  No changes reported     \"I want today to be my last day so I can save some of my visits for later in the year in case I need them. OBJECTIVE    25 min Therapeutic Activity:    Rationale:      increase ROM, increase strength, improve coordination, improve balance and increase proprioception to improve the patients ability to perform ADL's, gait, and functional mobility with increased safety and independence.       min Patient Education:  YES  Reviewed HEP   []  Progressed/Changed HEP based on:   Finalized HEP for LE strengthening/advised her to continue current balance HEP     Other Objective/Functional Measures:    Strength (1-5)  Hip Left Right   Flexion 4- 4-   Extension 4 4   Abduction 4+ 4+   Adduction 4+ 4+   Knee Left Right   Extension 5 5   Flexion 5 5     EO SLS: right 7\"/left: 4\"  FGA: /30    FOTO = 64/100       Post Treatment Pain Level (on 0 to 10) scale:   0  / 10     ASSESSMENT    Assessment/Changes in Function:     See DC note to MD     []  See Progress Note/Recertification        Progress toward goals / Updated goals:    See DC note to MD     PLAN    []  Upgrade activities as tolerated YES Continue plan of care   [x]  Discharge due to :    []  Other:      Therapist: Nyasia Hutson, PT    Date: 2/14/2020 Time: 1:39 PM     Future Appointments   Date Time Provider Yocasta Loaiza   2/18/2020  4:00 PM Marva Meigs, PT [facility]   2/20/2020  9:30 AM Marva Meigs, PT [facility]   2/25/2020 11:00 AM Marva Meigs, PT [facility]   2/27/2020 10:00 AM Marva Meigs, PT [facility]

## 2020-02-18 ENCOUNTER — APPOINTMENT (OUTPATIENT)
Dept: PHYSICAL THERAPY | Age: 75
End: 2020-02-18
Payer: MEDICARE

## 2020-02-20 ENCOUNTER — APPOINTMENT (OUTPATIENT)
Dept: PHYSICAL THERAPY | Age: 75
End: 2020-02-20
Payer: MEDICARE

## 2020-02-25 ENCOUNTER — APPOINTMENT (OUTPATIENT)
Dept: PHYSICAL THERAPY | Age: 75
End: 2020-02-25
Payer: MEDICARE

## 2020-02-27 ENCOUNTER — APPOINTMENT (OUTPATIENT)
Dept: PHYSICAL THERAPY | Age: 75
End: 2020-02-27
Payer: MEDICARE

## 2020-09-29 NOTE — PROGRESS NOTES
[address]  [facility] PHYSICAL THERAPY  [address] [facility] [address], [address] - Phone: [phone]  Fax: [phone] OF [address]          Patient Name: Amarilis Sullivan : 1945   Treatment/Medical Diagnosis: Bilateral hip pain [M25.551, M25.552]  Acute postoperative pain of hip [M25.559, G89.18]  Low back pain [M54.5]   Onset Date: Hip DOS 2017  Back 17    Referral Source: MD Dr. 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 1. Patient will demonstrate - supine to long sit test to correct pelvic obliquity and decrease symptoms with ADL's. Status at last Eval: + Current Status: - yes   2. Patient will improve B HS flexibility to -20 deg to decrease stress on spine. Status at last Eval: R -29  L -23 Current Status: R -30  L -26 no   3. Patient will be independent with HEP to self-manage and prevent symptoms upon DC. Status at last Eval: Compliant with HEP Current Status: Compliant with HEP progressing   4. Patient will improve lumbar FOTO score to 56 points to indicate improved functional status. Status at last Eval: 33 Current Status: 53 Almost met     5. Patient will improve B hip abd strength to 3+/5 to improve dynamic pelvic stability in gait.    Status at last Eval: R unable to assess  L 3-/5 Current Status: R 2-  L 3+ no   6. Patient will demonstrate proper lifting mechanics with 30# without increased symptoms to prepare for RTW. Status at last Eval: NT Current Status: NT n/a   7. Patient will perform 5 x sit to stand independently and equal LE WB in order to improve independence with functional mobility   Status at last Eval: Requires BUE assist and uneven WBing Current Status: Requires 1 UE assist and ongoing uneven WBing no   8. Patient will negotiate 10 steps with reciprocal pattern w/ LRAD and use of unilateral hand rail in order to improve safety with home/community navigation   Status at last Eval: Step-to pattern Current Status: Reciprocal pattern with BUE  no     9. Patient will demonstrate R hip flexion AROM of 110 degrees in order to improve ease with functional mobility   Status at last Eval: 108 deg Current Status: 119 deg yes     10. Patient will improve hip FOTO score to 54/100 to demonstrate a meaningful improvement in functional mobility and increased quality of life   Status at last Eval: 53 Current Status: 54 yes     Key Functional Changes/Progress: Patient has made fair progress with therapy, demonstrates ongoing difficulty with transfers and bed mobility, as well as side lying and stair negotiation. Patient able to demo R hip abd 2-/5, however unable to perform clam exercise on R with shortened lever. Patient reports ability to amb in [place] on uneven surfaces without pain. Patient continues to require UE assist with stairs and sit to/from stand, and continues to perseverate on fear of RTW, despite short term disability being extended to Oct 1. Hip FOTO score improved to 54 indicating 46% limitation in functional ability. Back FOTO score improved to 53 indicating 47% limitation in functional ability.     Problem List: pain affecting function, decrease ROM, decrease strength, edema affecting function, impaired gait/ balance, decrease ADL/ functional abilitiies, decrease activity tolerance, decrease flexibility/ joint mobility and decrease transfer abilities   Treatment Plan may include any combination of the following: Therapeutic exercise, Therapeutic activities, Neuromuscular re-education, Physical agent/modality, Gait/balance training, Manual therapy, Aquatic therapy, Patient education, Self Care training, Functional mobility training, Home safety training and Stair training  Patient Goal(s) has been updated and includes:      Goals for this certification period include and are to be achieved in   4  weeks:  1. Patient will improve B HS flexibility to -20 deg to decrease stress on spine. 2.   Patient will be independent with HEP to self-manage and prevent symptoms upon DC. 3.   Patient will improve lumbar FOTO score to 56 points to indicate improved functional status. 4.   Patient will improve B hip abd strength to 3+/5 to improve dynamic pelvic stability in gait. 5.   Patient will demonstrate proper lifting mechanics with 30# without increased symptoms to prepare for RTW. 6.   Patient will perform 5 x sit to stand independently and equal LE WB in order to improve independence with functional mobility  7. Patient will negotiate 10 steps with reciprocal pattern w/ LRAD and use of unilateral hand rail in order to improve safety with home/community navigation  Frequency / Duration:   Patient to be seen   2   times per week for   4    weeks:  G-Codes (GP): Mobility   Goal  CK= 40-59%  D/C  CK= 40-59%. The severity rating is based on the FOTO Score    Assessments/Recommendations: Patient would benefit from ongoing PT to further improve LE strength, mobility, and balance to improve safety with home and community negotiation  If you have any questions/comments please contact us directly at [phone]. Thank you for allowing us to assist in the care of your patient.     Therapist Signature: Varinder Foster PT Date: 0/0/3228   Certification Period:  Reporting Period: 8/3/17-9/2/17 5/31/17-8/3/17 Time: 9:16 AM   NOTE TO PHYSICIAN:  PLEASE COMPLETE THE ORDERS BELOW AND FAX TO   [facility] Physical Therapy: [phone]. If you are unable to process this request in 24 hours please contact our office: [phone].    ___ I have read the above report and request that my patient continue as recommended.   ___ I have read the above report and request that my patient continue therapy with the following changes/special instructions: ________________________________________________   ___ I have read the above report and request that my patient be discharged from therapy.      Physician Signature:        Date:       Time: a 36yoM, with PMH of Alcohol abuse, liver cirrhosis, and esophageal varices, p/w generalized weakness. Admitted for alcohol withdrawal (MELD score of 32 on admission), symptomatic anemia, and thrombocytopenia.
